# Patient Record
Sex: FEMALE | Race: WHITE | Employment: OTHER | URBAN - METROPOLITAN AREA
[De-identification: names, ages, dates, MRNs, and addresses within clinical notes are randomized per-mention and may not be internally consistent; named-entity substitution may affect disease eponyms.]

---

## 2017-06-10 RX ORDER — MAGNESIUM SULFATE HEPTAHYDRATE 40 MG/ML
2 INJECTION, SOLUTION INTRAVENOUS ONCE
Status: DISCONTINUED | OUTPATIENT
Start: 2017-06-12 | End: 2017-06-10

## 2017-06-12 ENCOUNTER — HOSPITAL ENCOUNTER (OUTPATIENT)
Dept: INFUSION THERAPY | Age: 72
Discharge: HOME | End: 2017-06-12
Payer: MEDICARE

## 2017-06-12 VITALS
HEART RATE: 63 BPM | DIASTOLIC BLOOD PRESSURE: 60 MMHG | OXYGEN SATURATION: 94 % | RESPIRATION RATE: 18 BRPM | TEMPERATURE: 97 F | SYSTOLIC BLOOD PRESSURE: 108 MMHG

## 2017-06-12 PROCEDURE — 74011250636 HC RX REV CODE- 250/636: Performed by: INTERNAL MEDICINE

## 2017-06-12 PROCEDURE — 96365 THER/PROPH/DIAG IV INF INIT: CPT

## 2017-06-12 PROCEDURE — 96366 THER/PROPH/DIAG IV INF ADDON: CPT

## 2017-06-12 RX ORDER — MAGNESIUM SULFATE HEPTAHYDRATE 40 MG/ML
2 INJECTION, SOLUTION INTRAVENOUS ONCE
Status: COMPLETED | OUTPATIENT
Start: 2017-06-12 | End: 2017-06-12

## 2017-06-12 RX ORDER — SODIUM CHLORIDE 9 MG/ML
25 INJECTION, SOLUTION INTRAVENOUS CONTINUOUS
Status: DISPENSED | OUTPATIENT
Start: 2017-06-12 | End: 2017-06-13

## 2017-06-12 RX ORDER — SODIUM CHLORIDE 0.9 % (FLUSH) 0.9 %
10-40 SYRINGE (ML) INJECTION AS NEEDED
Status: DISCONTINUED | OUTPATIENT
Start: 2017-06-12 | End: 2017-06-16 | Stop reason: HOSPADM

## 2017-06-12 RX ADMIN — SODIUM CHLORIDE 25 ML/HR: 0.9 INJECTION, SOLUTION INTRAVENOUS at 08:28

## 2017-06-12 RX ADMIN — Medication 10 ML: at 08:20

## 2017-06-12 RX ADMIN — Medication 10 ML: at 10:45

## 2017-06-12 RX ADMIN — MAGNESIUM SULFATE IN WATER 2 G: 40 INJECTION, SOLUTION INTRAVENOUS at 09:56

## 2017-06-12 RX ADMIN — MAGNESIUM SULFATE HEPTAHYDRATE 2 G: 40 INJECTION, SOLUTION INTRAVENOUS at 08:28

## 2017-06-12 NOTE — PROGRESS NOTES
[facility] Progress Note    Date: 2017    Name: Jamie Neil    MRN: [id number]         : 1945      Ms. Ernestina Olivier was assessed and education was provided. Patient has been to the infusion center before for Mag Sulfate many times. Patient cannot tolerate PO Mag. Ms. Filipe Rashid vitals were reviewed and patient was observed for 5 minutes prior to treatment. Visit Vitals    /60 (BP 1 Location: Left arm, BP Patient Position: At rest;Sitting)    Pulse 63    Temp 97 °F (36.1 °C)    Resp 18    SpO2 94%    Breastfeeding No       Lab results were reviewed. PIV #24G established in left ac [id number], positive for blood return. Before first bag of Mag sulfate was completed, pump kept alarming. Line was flushed and was positive for blood return, but pump continued to alarm. Removed PIV, placed gauze and coban. Established new PIV #22G in R AC, positive for blood return, second bag of Mag sulfate infused. 2g of mag sulfate x2 were infused over 1 hr each. PIV removed, gauze and coban placed    Ms. Leonard tolerated the infusion, and had no complaints. Patient armband removed and shredded. Ms. Ernestina Olivier was discharged from [place] [address] in stable condition at 1050. She has no further appointment with \[facility]\"" at this time.     Ludy Grimm RN  2017  11:24 AM

## 2017-07-03 ENCOUNTER — HOSPITAL ENCOUNTER (OUTPATIENT)
Dept: MAMMOGRAPHY | Age: 72
Discharge: HOME | End: 2017-07-03
Attending: INTERNAL MEDICINE
Payer: MEDICARE

## 2017-07-03 DIAGNOSIS — Z12.31 VISIT FOR SCREENING MAMMOGRAM: ICD-10-CM

## 2017-07-03 PROCEDURE — 77067 SCR MAMMO BI INCL CAD: CPT

## 2017-08-18 RX ORDER — MAGNESIUM SULFATE HEPTAHYDRATE 40 MG/ML
2 INJECTION, SOLUTION INTRAVENOUS ONCE
Status: DISCONTINUED | OUTPATIENT
Start: 2017-08-21 | End: 2017-08-18

## 2017-08-21 ENCOUNTER — HOSPITAL ENCOUNTER (OUTPATIENT)
Dept: INFUSION THERAPY | Age: 72
Discharge: HOME | End: 2017-08-21
Payer: MEDICARE

## 2017-08-21 VITALS
OXYGEN SATURATION: 97 % | RESPIRATION RATE: 18 BRPM | HEART RATE: 72 BPM | SYSTOLIC BLOOD PRESSURE: 134 MMHG | TEMPERATURE: 97.1 F | DIASTOLIC BLOOD PRESSURE: 66 MMHG

## 2017-08-21 PROCEDURE — 74011250636 HC RX REV CODE- 250/636: Performed by: INTERNAL MEDICINE

## 2017-08-21 PROCEDURE — 96365 THER/PROPH/DIAG IV INF INIT: CPT

## 2017-08-21 RX ORDER — MAGNESIUM SULFATE HEPTAHYDRATE 40 MG/ML
2 INJECTION, SOLUTION INTRAVENOUS ONCE
Status: DISCONTINUED | OUTPATIENT
Start: 2017-08-21 | End: 2017-08-21

## 2017-08-21 RX ORDER — SODIUM CHLORIDE 9 MG/ML
50 INJECTION, SOLUTION INTRAVENOUS ONCE
Status: COMPLETED | OUTPATIENT
Start: 2017-08-21 | End: 2017-08-21

## 2017-08-21 RX ORDER — MAGNESIUM SULFATE IN 0.9% NACL 2 G/100 ML
2 INTRAVENOUS SOLUTION, PIGGYBACK (ML) INTRAVENOUS ONCE
Status: COMPLETED | OUTPATIENT
Start: 2017-08-21 | End: 2017-08-21

## 2017-08-21 RX ORDER — SODIUM CHLORIDE 0.9 % (FLUSH) 0.9 %
10-40 SYRINGE (ML) INJECTION AS NEEDED
Status: DISCONTINUED | OUTPATIENT
Start: 2017-08-21 | End: 2017-08-25 | Stop reason: HOSPADM

## 2017-08-21 RX ADMIN — Medication 2 G: at 08:34

## 2017-08-21 RX ADMIN — Medication 10 ML: at 08:17

## 2017-08-21 RX ADMIN — SODIUM CHLORIDE 50 ML/HR: 900 INJECTION, SOLUTION INTRAVENOUS at 08:17

## 2017-10-03 RX ORDER — MAGNESIUM SULFATE HEPTAHYDRATE 40 MG/ML
2 INJECTION, SOLUTION INTRAVENOUS ONCE
Status: DISCONTINUED | OUTPATIENT
Start: 2017-10-06 | End: 2017-10-03

## 2017-10-06 ENCOUNTER — HOSPITAL ENCOUNTER (OUTPATIENT)
Dept: INFUSION THERAPY | Age: 72
Discharge: HOME | End: 2017-10-06
Payer: MEDICARE

## 2017-10-06 VITALS
RESPIRATION RATE: 18 BRPM | TEMPERATURE: 97.6 F | DIASTOLIC BLOOD PRESSURE: 65 MMHG | SYSTOLIC BLOOD PRESSURE: 129 MMHG | HEART RATE: 71 BPM | OXYGEN SATURATION: 98 %

## 2017-10-06 PROCEDURE — 74011000258 HC RX REV CODE- 258: Performed by: INTERNAL MEDICINE

## 2017-10-06 PROCEDURE — 96366 THER/PROPH/DIAG IV INF ADDON: CPT

## 2017-10-06 PROCEDURE — 74011250636 HC RX REV CODE- 250/636: Performed by: INTERNAL MEDICINE

## 2017-10-06 PROCEDURE — 96365 THER/PROPH/DIAG IV INF INIT: CPT

## 2017-10-06 RX ORDER — SODIUM CHLORIDE 0.9 % (FLUSH) 0.9 %
10-40 SYRINGE (ML) INJECTION AS NEEDED
Status: DISCONTINUED | OUTPATIENT
Start: 2017-10-06 | End: 2017-10-10 | Stop reason: HOSPADM

## 2017-10-06 RX ADMIN — Medication 10 ML: at 08:19

## 2017-10-06 RX ADMIN — MAGNESIUM SULFATE HEPTAHYDRATE: 500 INJECTION, SOLUTION INTRAMUSCULAR; INTRAVENOUS at 08:21

## 2017-10-06 RX ADMIN — Medication 10 ML: at 10:03

## 2017-10-06 NOTE — PROGRESS NOTES
[facility] Progress Note    Date: 2017    Name: Sinai Murdock    MRN: [id number]         : 1945      Ms. Conrad Mcardle arrived to [facility] at 1293. Ms. Conrad Mcardle was assessed and education was provided. Ms. Irma Wilkes vitals were reviewed. Visit Vitals    /65    Pulse 71    Temp 97.6 °F (36.4 °C)    Resp 18    SpO2 98%    Breastfeeding No       24g IV inserted in patient's left AC x1 attempt. Positive for blood return/ flushes without difficulty. Magnesium Sulfate 2gm IV administered as ordered followed by NS flush. Ms. Conrad Mcardle tolerated well without complaints. IV removed/ intact. Gauze/ coban to site. Ms. Conrad Mcardle was discharged from [place] [address] in stable condition at 1010. She has no future appointments with the [facility] at this time.     Avelina Krishna  2017

## 2017-12-18 ENCOUNTER — HOSPITAL ENCOUNTER (OUTPATIENT)
Dept: INFUSION THERAPY | Age: 72
Discharge: HOME | End: 2017-12-18
Payer: MEDICARE

## 2017-12-18 VITALS
RESPIRATION RATE: 16 BRPM | WEIGHT: 133.9 LBS | OXYGEN SATURATION: 99 % | SYSTOLIC BLOOD PRESSURE: 115 MMHG | HEART RATE: 77 BPM | HEIGHT: 61 IN | TEMPERATURE: 97 F | DIASTOLIC BLOOD PRESSURE: 60 MMHG | BODY MASS INDEX: 25.28 KG/M2

## 2017-12-18 PROCEDURE — 74011250636 HC RX REV CODE- 250/636

## 2017-12-18 PROCEDURE — 96365 THER/PROPH/DIAG IV INF INIT: CPT

## 2017-12-18 PROCEDURE — 96366 THER/PROPH/DIAG IV INF ADDON: CPT

## 2017-12-18 PROCEDURE — 74011250636 HC RX REV CODE- 250/636: Performed by: INTERNAL MEDICINE

## 2017-12-18 PROCEDURE — 74011000258 HC RX REV CODE- 258: Performed by: INTERNAL MEDICINE

## 2017-12-18 RX ORDER — SODIUM CHLORIDE 0.9 % (FLUSH) 0.9 %
10-40 SYRINGE (ML) INJECTION AS NEEDED
Status: DISCONTINUED | OUTPATIENT
Start: 2017-12-18 | End: 2017-12-22 | Stop reason: HOSPADM

## 2017-12-18 RX ORDER — MAGNESIUM SULFATE HEPTAHYDRATE 40 MG/ML
2 INJECTION, SOLUTION INTRAVENOUS ONCE
Status: COMPLETED | OUTPATIENT
Start: 2017-12-18 | End: 2017-12-18

## 2017-12-18 RX ORDER — SODIUM CHLORIDE 9 MG/ML
250 INJECTION, SOLUTION INTRAVENOUS AS NEEDED
Status: DISPENSED | OUTPATIENT
Start: 2017-12-18 | End: 2017-12-19

## 2017-12-18 RX ORDER — MAGNESIUM SULFATE HEPTAHYDRATE 40 MG/ML
INJECTION, SOLUTION INTRAVENOUS
Status: COMPLETED
Start: 2017-12-18 | End: 2017-12-18

## 2017-12-18 RX ADMIN — Medication 10 ML: at 14:31

## 2017-12-18 RX ADMIN — MAGNESIUM SULFATE HEPTAHYDRATE 2 G: 40 INJECTION, SOLUTION INTRAVENOUS at 14:32

## 2017-12-18 RX ADMIN — Medication 10 ML: at 13:38

## 2017-12-18 RX ADMIN — MAGNESIUM SULFATE HEPTAHYDRATE: 500 INJECTION, SOLUTION INTRAMUSCULAR; INTRAVENOUS at 11:40

## 2017-12-18 RX ADMIN — Medication 10 ML: at 15:39

## 2017-12-18 RX ADMIN — Medication 10 ML: at 11:36

## 2017-12-18 NOTE — PROGRESS NOTES
[facility] Progress Note    Date: 2017    Name: Jamie Neil    MRN: [id number]         : 1945      Ms. Ernestina Olivier was assessed and education was provided. Ms. Filipe Rashid vitals were reviewed and patient was observed for 5 minutes prior to treatment. Visit Vitals    /66 (BP 1 Location: Right arm, BP Patient Position: At rest;Sitting)    Pulse 70    Temp 97.2 °F (36.2 °C)    Resp 16    Ht 5' 1\" (1.549 m)    Wt 60.7 kg (133 lb 14.4 oz)    SpO2 99%    Breastfeeding No    BMI 25.3 kg/m2       Magnesium  Total 4 grams IV given via peripheral line left antecubital area. At 1536 with only approx 2 ml magnesium infusion left to infuse, Ms Ernestina Olivier reported achiness in left elbow. Her IV site was left antecubital space. IV was stopped immediately. There was no reddness or swelling noted in the area. There was positive blood return from the IV. The area felt better with repositioning. And last 2 ml infused at patient's request.    Ms Sachin Jaeger declined staying for observation after infusion. Ms. Ernestina Olivier tolerated the infusion, and had no complaints. Patient armband removed and shredded. Ms. Ernestina Olivier was discharged from [place] [address] in stable condition at [phone]. She has no more OPIC appts.     Brandy Camarena RN  2017

## 2017-12-19 ENCOUNTER — HOSPITAL ENCOUNTER (OUTPATIENT)
Dept: INFUSION THERAPY | Age: 72
End: 2017-12-19
Payer: MEDICARE

## 2018-01-29 RX ORDER — MAGNESIUM SULFATE HEPTAHYDRATE 40 MG/ML
2 INJECTION, SOLUTION INTRAVENOUS ONCE
Status: DISCONTINUED | OUTPATIENT
Start: 2018-01-29 | End: 2018-01-29

## 2018-01-30 RX ORDER — MAGNESIUM SULFATE HEPTAHYDRATE 40 MG/ML
2 INJECTION, SOLUTION INTRAVENOUS ONCE
Status: DISCONTINUED | OUTPATIENT
Start: 2018-02-01 | End: 2018-01-30

## 2018-01-31 RX ORDER — MAGNESIUM SULFATE HEPTAHYDRATE 40 MG/ML
2 INJECTION, SOLUTION INTRAVENOUS ONCE
Status: CANCELLED | OUTPATIENT
Start: 2018-02-02 | End: 2018-02-02

## 2018-02-01 ENCOUNTER — HOSPITAL ENCOUNTER (OUTPATIENT)
Dept: INFUSION THERAPY | Age: 73
Discharge: HOME | End: 2018-02-01
Payer: MEDICARE

## 2018-02-01 VITALS
OXYGEN SATURATION: 94 % | RESPIRATION RATE: 18 BRPM | DIASTOLIC BLOOD PRESSURE: 65 MMHG | SYSTOLIC BLOOD PRESSURE: 135 MMHG | TEMPERATURE: 97.2 F | HEART RATE: 61 BPM

## 2018-02-01 PROCEDURE — 74011250636 HC RX REV CODE- 250/636: Performed by: INTERNAL MEDICINE

## 2018-02-01 PROCEDURE — 96366 THER/PROPH/DIAG IV INF ADDON: CPT

## 2018-02-01 PROCEDURE — 96365 THER/PROPH/DIAG IV INF INIT: CPT

## 2018-02-01 RX ORDER — MAGNESIUM SULFATE HEPTAHYDRATE 40 MG/ML
2 INJECTION, SOLUTION INTRAVENOUS ONCE
Status: COMPLETED | OUTPATIENT
Start: 2018-02-01 | End: 2018-02-01

## 2018-02-01 RX ORDER — SODIUM CHLORIDE 0.9 % (FLUSH) 0.9 %
10 SYRINGE (ML) INJECTION AS NEEDED
Status: DISCONTINUED | OUTPATIENT
Start: 2018-02-01 | End: 2018-02-05 | Stop reason: HOSPADM

## 2018-02-01 RX ORDER — METOPROLOL SUCCINATE 50 MG/1
50 TABLET, EXTENDED RELEASE ORAL DAILY
COMMUNITY
End: 2018-08-28

## 2018-02-01 RX ADMIN — Medication 10 ML: at 08:25

## 2018-02-01 RX ADMIN — Medication 10 ML: at 10:58

## 2018-02-01 RX ADMIN — MAGNESIUM SULFATE HEPTAHYDRATE 2 G: 40 INJECTION, SOLUTION INTRAVENOUS at 08:33

## 2018-02-01 RX ADMIN — MAGNESIUM SULFATE HEPTAHYDRATE 2 G: 40 INJECTION, SOLUTION INTRAVENOUS at 09:38

## 2018-02-01 NOTE — PROGRESS NOTES
[facility] Progress Note    Date: 2018    Name: Keisha Ott    MRN: [id number]         : 1945      Ms. Noemi Lundberg arrived to [facility] at 810. Ms. Noemi Lundberg was assessed and education was provided. Patient reports intermittent diarrhea but denies any other complaints today. Ms. Penelope Conrad vitals were reviewed. Visit Vitals    /68 (BP 1 Location: Left arm, BP Patient Position: Sitting)    Pulse 72    Temp 97.2 °F (36.2 °C)    Resp 18    SpO2 100%    Breastfeeding No       24g IV inserted in patient's Left antecubital  x1 attempt. Positive for blood return/ flushes without difficulty. 4 g Mag Sulfate administered as ordered over 2 hours followed by NS flush. Ms. Noemi Lundberg tolerated well without complaints. IV removed/ intact. Gauze/ coban to site. Ms. Noemi Lundberg was discharged from [place] [address] in stable condition at . No further appointments are scheduled at this office. Patient is advised to follow up with her physician.   Robert Parks RN  2018

## 2018-02-02 ENCOUNTER — HOSPITAL ENCOUNTER (OUTPATIENT)
Dept: INFUSION THERAPY | Age: 73
End: 2018-02-02
Payer: MEDICARE

## 2018-05-30 NOTE — PROGRESS NOTES
[facility] Progress Note    Date: 2017    Name: Wendy Olguin    MRN: [id number]         : 1945      Ms. Ever Collins arrived to [facility] at 3775. Ms. Ever Collins was assessed and education was provided. Ms. Natalie Ramírez vitals were reviewed. Visit Vitals    /69 (BP 1 Location: Left arm, BP Patient Position: Sitting)    Pulse 71    Temp 97.1 °F (36.2 °C)    Resp 18    SpO2 98%       24g IV inserted in patient's left AC x1 attempt. Positive for blood return/ flushes without difficulty. Magnesium Sulfate 2gm IV administered as ordered followed by NS flush. Ms. Ever Collins tolerated well without complaints. IV removed/ intact. Gauze/ coban to site. Ms. Ever Collins was discharged from [place] [address] in stable condition at [address] She has no future appointments with the [facility] at this time.     Renetta Altman RN  2017
No

## 2018-05-31 RX ORDER — MAGNESIUM SULFATE HEPTAHYDRATE 40 MG/ML
2 INJECTION, SOLUTION INTRAVENOUS ONCE
Status: DISCONTINUED | OUTPATIENT
Start: 2018-06-01 | End: 2018-05-31

## 2018-06-01 ENCOUNTER — HOSPITAL ENCOUNTER (OUTPATIENT)
Dept: INFUSION THERAPY | Age: 73
Discharge: HOME | End: 2018-06-01
Payer: MEDICARE

## 2018-06-01 VITALS
RESPIRATION RATE: 18 BRPM | HEART RATE: 80 BPM | OXYGEN SATURATION: 94 % | TEMPERATURE: 98.1 F | SYSTOLIC BLOOD PRESSURE: 139 MMHG | DIASTOLIC BLOOD PRESSURE: 72 MMHG

## 2018-06-01 PROCEDURE — 96365 THER/PROPH/DIAG IV INF INIT: CPT

## 2018-06-01 PROCEDURE — 74011250636 HC RX REV CODE- 250/636: Performed by: INTERNAL MEDICINE

## 2018-06-01 RX ORDER — SODIUM CHLORIDE 0.9 % (FLUSH) 0.9 %
10-40 SYRINGE (ML) INJECTION AS NEEDED
Status: DISCONTINUED | OUTPATIENT
Start: 2018-06-01 | End: 2018-06-05 | Stop reason: HOSPADM

## 2018-06-01 RX ORDER — MAGNESIUM SULFATE HEPTAHYDRATE 40 MG/ML
2 INJECTION, SOLUTION INTRAVENOUS ONCE
Status: COMPLETED | OUTPATIENT
Start: 2018-06-01 | End: 2018-06-01

## 2018-06-01 RX ADMIN — Medication 10 ML: at 09:15

## 2018-06-01 RX ADMIN — Medication 10 ML: at 10:18

## 2018-06-01 RX ADMIN — MAGNESIUM SULFATE HEPTAHYDRATE 2 G: 40 INJECTION, SOLUTION INTRAVENOUS at 09:23

## 2018-06-01 NOTE — PROGRESS NOTES
[facility] Progress Note    Date: 2018    Name: Jaycee Thomas    MRN: [id number]         : 1945     Mag Infusion 2GM      Ms. Kade Smith arrived to [facility] at [phone]. Ms. Kade Smith was assessed and education was provided. Ms. Madeline Govea vitals were reviewed. Visit Vitals    /72 (BP 1 Location: Left arm, BP Patient Position: Sitting)    Pulse 80    Temp 98.1 °F (36.7 °C)    Resp 18    SpO2 94%    Breastfeeding No       24g IV inserted in patient's left AC x1 attempt. Positive for blood return/ flushes without difficulty. Magnesium Sulfate 2gm IV administered as ordered over 1 hour followed by NS flush. Ms. Kade Smith tolerated well without complaints. IV removed/ intact. Gauze/ coban to site. Ms. Kade Smith was discharged from [place] [address] in stable condition at 1020. She has no future appointments with the [facility] at this time.     Hardik Jurado RN  2018

## 2018-07-09 ENCOUNTER — HOSPITAL ENCOUNTER (OUTPATIENT)
Dept: MAMMOGRAPHY | Age: 73
Discharge: HOME | End: 2018-07-09
Attending: INTERNAL MEDICINE
Payer: MEDICARE

## 2018-07-09 ENCOUNTER — HOSPITAL ENCOUNTER (OUTPATIENT)
Dept: GENERAL RADIOLOGY | Age: 73
Discharge: HOME | End: 2018-07-09
Attending: INTERNAL MEDICINE
Payer: MEDICARE

## 2018-07-09 DIAGNOSIS — Z12.31 VISIT FOR SCREENING MAMMOGRAM: ICD-10-CM

## 2018-07-09 DIAGNOSIS — Z87.39 HISTORY OF OSTEOPENIA: ICD-10-CM

## 2018-07-09 DIAGNOSIS — Z78.0 ASYMPTOMATIC MENOPAUSAL STATE: ICD-10-CM

## 2018-07-09 PROCEDURE — 77080 DXA BONE DENSITY AXIAL: CPT

## 2018-07-09 PROCEDURE — 77067 SCR MAMMO BI INCL CAD: CPT

## 2018-07-25 RX ORDER — MAGNESIUM SULFATE HEPTAHYDRATE 40 MG/ML
2 INJECTION, SOLUTION INTRAVENOUS ONCE
Status: DISCONTINUED | OUTPATIENT
Start: 2018-07-30 | End: 2018-07-25 | Stop reason: SDUPTHER

## 2018-07-30 ENCOUNTER — HOSPITAL ENCOUNTER (OUTPATIENT)
Dept: INFUSION THERAPY | Age: 73
Discharge: HOME | End: 2018-07-30
Payer: MEDICARE

## 2018-07-30 VITALS
DIASTOLIC BLOOD PRESSURE: 72 MMHG | RESPIRATION RATE: 18 BRPM | SYSTOLIC BLOOD PRESSURE: 153 MMHG | TEMPERATURE: 97.1 F | OXYGEN SATURATION: 97 % | HEART RATE: 80 BPM

## 2018-07-30 PROCEDURE — 96365 THER/PROPH/DIAG IV INF INIT: CPT

## 2018-07-30 PROCEDURE — 74011250636 HC RX REV CODE- 250/636: Performed by: INTERNAL MEDICINE

## 2018-07-30 RX ORDER — SODIUM CHLORIDE 0.9 % (FLUSH) 0.9 %
10-40 SYRINGE (ML) INJECTION AS NEEDED
Status: DISCONTINUED | OUTPATIENT
Start: 2018-07-30 | End: 2018-08-03 | Stop reason: HOSPADM

## 2018-07-30 RX ORDER — MAGNESIUM SULFATE HEPTAHYDRATE 40 MG/ML
2 INJECTION, SOLUTION INTRAVENOUS ONCE
Status: COMPLETED | OUTPATIENT
Start: 2018-07-30 | End: 2018-07-30

## 2018-07-30 RX ADMIN — MAGNESIUM SULFATE HEPTAHYDRATE 2 G: 40 INJECTION, SOLUTION INTRAVENOUS at 08:27

## 2018-07-30 RX ADMIN — Medication 10 ML: at 09:25

## 2018-07-30 RX ADMIN — Medication 10 ML: at 08:30

## 2018-07-30 NOTE — PROGRESS NOTES
[facility] Progress Note Date: 2018 Name: Kade Metz MRN: [id number] : 1945 Mag Infusion 2GM Ms. Peter Roy arrived to [facility] at 56. Ms. Peter Roy was assessed and education was provided. Ms. Jeniffer Hackett vitals were reviewed. Visit Vitals  /72 (BP 1 Location: Right arm, BP Patient Position: Sitting)  Pulse 80  Temp 97.1 °F (36.2 °C)  Resp 18  SpO2 97%  Breastfeeding No  
 
 
24g IV inserted in patient's left AC x1 attempt. Positive for blood return/ flushes without difficulty. Magnesium Sulfate 2gm IV administered as ordered over 1 hour followed by NS flush. Ms. Peter Roy tolerated well without complaints. IV removed/ intact. Gauze/ coban to site. Ms. Peter Roy was discharged from [place] [address] in stable condition at 0930. She has no future appointments with the [facility] at this time. Dot Andres RN 
2018

## 2018-08-25 ENCOUNTER — HOSPITAL ENCOUNTER (INPATIENT)
Age: 73
LOS: 3 days | Discharge: HOME | DRG: 483 | End: 2018-08-28
Attending: EMERGENCY MEDICINE | Admitting: INTERNAL MEDICINE
Payer: MEDICARE

## 2018-08-25 ENCOUNTER — APPOINTMENT (OUTPATIENT)
Dept: GENERAL RADIOLOGY | Age: 73
DRG: 483 | End: 2018-08-25
Attending: PHYSICIAN ASSISTANT
Payer: MEDICARE

## 2018-08-25 ENCOUNTER — APPOINTMENT (OUTPATIENT)
Dept: GENERAL RADIOLOGY | Age: 73
DRG: 483 | End: 2018-08-25
Attending: NURSE PRACTITIONER
Payer: MEDICARE
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
## 2018-08-25 DIAGNOSIS — S43.004A SHOULDER DISLOCATION, RIGHT, INITIAL ENCOUNTER: ICD-10-CM

## 2018-08-25 DIAGNOSIS — S42.354A CLOSED NONDISPLACED COMMINUTED FRACTURE OF SHAFT OF RIGHT HUMERUS, INITIAL ENCOUNTER: Primary | ICD-10-CM

## 2018-08-25 DIAGNOSIS — S92.355A CLOSED NONDISPLACED FRACTURE OF FIFTH METATARSAL BONE OF LEFT FOOT, INITIAL ENCOUNTER: ICD-10-CM

## 2018-08-25 DIAGNOSIS — S00.31XA ABRASION OF NOSE, INITIAL ENCOUNTER: ICD-10-CM

## 2018-08-25 PROBLEM — S42.91XA SHOULDER FRACTURE, RIGHT, CLOSED, INITIAL ENCOUNTER: Status: ACTIVE | Noted: 2018-08-25

## 2018-08-25 LAB
ALBUMIN SERPL-MCNC: 4.2 G/DL (ref 3.4–5)
ALBUMIN/GLOB SERPL: 1.2 {RATIO} (ref 0.8–1.7)
ALP SERPL-CCNC: 57 U/L (ref 45–117)
ALT SERPL-CCNC: 33 U/L (ref 13–56)
ANION GAP SERPL CALC-SCNC: 12 MMOL/L (ref 3–18)
AST SERPL-CCNC: 22 U/L (ref 15–37)
BASOPHILS # BLD: 0 K/UL (ref 0–0.1)
BASOPHILS NFR BLD: 0 % (ref 0–2)
BILIRUB SERPL-MCNC: 0.3 MG/DL (ref 0.2–1)
BUN SERPL-MCNC: 11 MG/DL (ref 7–18)
BUN/CREAT SERPL: 10 (ref 12–20)
CALCIUM SERPL-MCNC: 9 MG/DL (ref 8.5–10.1)
CHLORIDE SERPL-SCNC: 105 MMOL/L (ref 100–108)
CO2 SERPL-SCNC: 24 MMOL/L (ref 21–32)
CREAT SERPL-MCNC: 1.07 MG/DL (ref 0.6–1.3)
DIFFERENTIAL METHOD BLD: ABNORMAL
EOSINOPHIL # BLD: 0.1 K/UL (ref 0–0.4)
EOSINOPHIL NFR BLD: 1 % (ref 0–5)
ERYTHROCYTE [DISTWIDTH] IN BLOOD BY AUTOMATED COUNT: 12.7 % (ref 11.6–14.5)
GLOBULIN SER CALC-MCNC: 3.6 G/DL (ref 2–4)
GLUCOSE SERPL-MCNC: 124 MG/DL (ref 74–99)
HCT VFR BLD AUTO: 41.2 % (ref 35–45)
HGB BLD-MCNC: 14.2 G/DL (ref 12–16)
INR PPP: 0.9 (ref 0.8–1.2)
LYMPHOCYTES # BLD: 2.1 K/UL (ref 0.9–3.6)
LYMPHOCYTES NFR BLD: 17 % (ref 21–52)
MCH RBC QN AUTO: 32.2 PG (ref 24–34)
MCHC RBC AUTO-ENTMCNC: 34.5 G/DL (ref 31–37)
MCV RBC AUTO: 93.4 FL (ref 74–97)
MONOCYTES # BLD: 1 K/UL (ref 0.05–1.2)
MONOCYTES NFR BLD: 8 % (ref 3–10)
NEUTS SEG # BLD: 9.1 K/UL (ref 1.8–8)
NEUTS SEG NFR BLD: 74 % (ref 40–73)
PLATELET # BLD AUTO: 264 K/UL (ref 135–420)
PMV BLD AUTO: 10.4 FL (ref 9.2–11.8)
POTASSIUM SERPL-SCNC: 3.5 MMOL/L (ref 3.5–5.5)
PROT SERPL-MCNC: 7.8 G/DL (ref 6.4–8.2)
PROTHROMBIN TIME: 12.1 SEC (ref 11.5–15.2)
RBC # BLD AUTO: 4.41 M/UL (ref 4.2–5.3)
SODIUM SERPL-SCNC: 141 MMOL/L (ref 136–145)
WBC # BLD AUTO: 12.4 K/UL (ref 4.6–13.2)

## 2018-08-25 PROCEDURE — 65270000029 HC RM PRIVATE

## 2018-08-25 PROCEDURE — 96374 THER/PROPH/DIAG INJ IV PUSH: CPT

## 2018-08-25 PROCEDURE — 73030 X-RAY EXAM OF SHOULDER: CPT

## 2018-08-25 PROCEDURE — 93005 ELECTROCARDIOGRAM TRACING: CPT

## 2018-08-25 PROCEDURE — 73130 X-RAY EXAM OF HAND: CPT

## 2018-08-25 PROCEDURE — 74011250636 HC RX REV CODE- 250/636: Performed by: PHYSICIAN ASSISTANT

## 2018-08-25 PROCEDURE — 77030027138 HC INCENT SPIROMETER -A

## 2018-08-25 PROCEDURE — 71045 X-RAY EXAM CHEST 1 VIEW: CPT

## 2018-08-25 PROCEDURE — 99284 EMERGENCY DEPT VISIT MOD MDM: CPT

## 2018-08-25 PROCEDURE — 85610 PROTHROMBIN TIME: CPT

## 2018-08-25 PROCEDURE — 74011250637 HC RX REV CODE- 250/637: Performed by: NURSE PRACTITIONER

## 2018-08-25 PROCEDURE — 80053 COMPREHEN METABOLIC PANEL: CPT

## 2018-08-25 PROCEDURE — 85025 COMPLETE CBC W/AUTO DIFF WBC: CPT

## 2018-08-25 PROCEDURE — 74011000258 HC RX REV CODE- 258: Performed by: INTERNAL MEDICINE

## 2018-08-25 RX ORDER — MORPHINE SULFATE 2 MG/ML
4 INJECTION, SOLUTION INTRAMUSCULAR; INTRAVENOUS
Status: ACTIVE | OUTPATIENT
Start: 2018-08-25 | End: 2018-08-26

## 2018-08-25 RX ORDER — ONDANSETRON 2 MG/ML
4 INJECTION INTRAMUSCULAR; INTRAVENOUS
Status: COMPLETED | OUTPATIENT
Start: 2018-08-25 | End: 2018-08-25

## 2018-08-25 RX ORDER — HYDROCODONE BITARTRATE AND ACETAMINOPHEN 5; 325 MG/1; MG/1
1 TABLET ORAL
Status: COMPLETED | OUTPATIENT
Start: 2018-08-25 | End: 2018-08-25

## 2018-08-25 RX ORDER — MORPHINE SULFATE 4 MG/ML
4 INJECTION INTRAVENOUS
Status: DISCONTINUED | OUTPATIENT
Start: 2018-08-25 | End: 2018-08-26 | Stop reason: RX

## 2018-08-25 RX ORDER — DEXTROSE MONOHYDRATE AND SODIUM CHLORIDE 5; .9 G/100ML; G/100ML
75 INJECTION, SOLUTION INTRAVENOUS CONTINUOUS
Status: DISCONTINUED | OUTPATIENT
Start: 2018-08-25 | End: 2018-08-27

## 2018-08-25 RX ORDER — MORPHINE SULFATE 4 MG/ML
4 INJECTION INTRAVENOUS
Status: COMPLETED | OUTPATIENT
Start: 2018-08-25 | End: 2018-08-25

## 2018-08-25 RX ADMIN — SODIUM CHLORIDE 1000 ML: 900 INJECTION, SOLUTION INTRAVENOUS at 21:15

## 2018-08-25 RX ADMIN — MORPHINE SULFATE 4 MG: 4 INJECTION INTRAVENOUS at 23:05

## 2018-08-25 RX ADMIN — MORPHINE SULFATE 4 MG: 4 INJECTION INTRAVENOUS at 20:54

## 2018-08-25 RX ADMIN — ONDANSETRON 4 MG: 2 INJECTION, SOLUTION INTRAMUSCULAR; INTRAVENOUS at 21:13

## 2018-08-25 RX ADMIN — HYDROCODONE BITARTRATE AND ACETAMINOPHEN 1 TABLET: 5; 325 TABLET ORAL at 20:14

## 2018-08-25 RX ADMIN — DEXTROSE MONOHYDRATE AND SODIUM CHLORIDE 75 ML/HR: 5; .9 INJECTION, SOLUTION INTRAVENOUS at 23:04

## 2018-08-25 NOTE — ED NOTES
I performed a brief evaluation, including history and physical, of the patient here in triage and I have determined that pt will need further treatment and evaluation from the main side ER physician. I have placed initial orders to help in expediting patients care. August 25, 2018 at 7:43 PM -  Shola Dunn NP        Visit Vitals    /65 (BP 1 Location: Right arm, BP Patient Position: At rest)    Pulse 65    Temp 97.7 °F (36.5 °C)    Resp 16    Ht 5' 1\" (1.549 m)    Wt 57.6 kg (127 lb)    SpO2 98%    BMI 24 kg/m2          Scribe Attestation     Pin digital acting as a scribe for and in the presence of Shola Dunn NP      August 25, 2018 at 7:43 PM       Provider Attestation:      I personally performed the services described in the documentation, reviewed the documentation, as recorded by the scribe in my presence, and it accurately and completely records my words and actions.  August 25, 2018 at 7:43 PM - Shola Dunn NP

## 2018-08-25 NOTE — IP AVS SNAPSHOT
[address] Patient: Sydney Brunner MRN: [id number] :1945 About your hospitalization You were admitted on:  2018 You last received care in the:  [address] [place] [address],[address] You were discharged on:  2018 Why you were hospitalized Your primary diagnosis was:  Not on File Your diagnoses also included:  Shoulder Fracture, Right, Closed, Initial Encounter, Shoulder Dislocation, Right, Initial Encounter, Fracture Of Proximal Phalanx Of Left Hand, Htn (Hypertension), Myasthenia Gravis (Hcc) Follow-up Information Follow up With Details Comments Contact Info Paola Hall MD   [address] [facility] [address] [facility] [address] [phone] Mariangel Sims MD  as scheduled [place] [address] [address] [facility] [address] 
[phone] Discharge Orders None A check hollie indicates which time of day the medication should be taken. My Medications START taking these medications Instructions Each Dose to Equal  
 Morning Noon Evening Bedtime  
 oxyCODONE-acetaminophen 5-325 mg per tablet Commonly known as:  PERCOCET Your last dose was: Your next dose is: Take 1 Tab by mouth every four (4) hours as needed. Max Daily Amount: 6 Tabs. 1 Tab CONTINUE taking these medications Instructions Each Dose to Equal  
 Morning Noon Evening Bedtime  
 calcium carbonate 500 mg calcium (1,250 mg) tablet Commonly known as:  OS-ERA Your last dose was: Your next dose is: Take  by mouth two (2) times a day. Indications: HYPOCALCEMIA PREVENTION  
     
   
   
   
  
 COZAAR 100 mg tablet Generic drug:  losartan Your last dose was: Your next dose is: Take 100 mg by mouth daily.  Indications: HYPERTENSION  
 100 mg  
    
   
   
   
  
 NORVASC PO Your last dose was: Your next dose is: Take 7.5 mg by mouth daily. 7.5 mg  
    
   
   
   
  
 potassium chloride 20 mEq tablet Commonly known as:  K-CHRISTIANO DIAZOR-CON Your last dose was: Your next dose is: Take 20 mEq by mouth two (2) times a day. Indications: HYPOKALEMIA PREVENTION  
 20 mEq PriLOSEC 20 mg capsule Generic drug:  omeprazole Your last dose was: Your next dose is: Take 20 mg by mouth daily. Indications: GASTROESOPHAGEAL REFLUX  
 20 mg  
    
   
   
   
  
 ZOCOR 40 mg tablet Generic drug:  simvastatin Your last dose was: Your next dose is: Take 40 mg by mouth nightly. Indications: HYPERCHOLESTEROLEMIA 40 mg  
    
   
   
   
  
  
STOP taking these medications   
 metoprolol succinate 50 mg XL tablet Commonly known as:  TOPROL-XL Where to Get Your Medications Information on where to get these meds will be given to you by the nurse or doctor. ! Ask your nurse or doctor about these medications  
  oxyCODONE-acetaminophen 5-325 mg per tablet Opioid Education Prescription Opioids: What You Need to Know: 
 
 
 
F-face looks uneven A-arms unable to move or move unevenly S-speech slurred or non-existent T-time-call 911 as soon as signs and symptoms begin-DO NOT go  
 Back to bed or wait to see if you get better-TIME IS BRAIN. Warning Signs of HEART ATTACK Call 911 if you have these symptoms: 
? Chest discomfort. Most heart attacks involve discomfort in the center of the chest that lasts more than a few minutes, or that goes away and comes back. It can feel like uncomfortable pressure, squeezing, fullness, or pain. ? Discomfort in other areas of the upper body. Symptoms can include pain or discomfort in one or both arms, the back, neck, jaw, or stomach. ? Shortness of breath with or without chest discomfort. ? Other signs may include breaking out in a cold sweat, nausea, or lightheadedness. Don't wait more than five minutes to call [address]! Fast action can save your life. Calling 911 is almost always the fastest way to get lifesaving treatment. Emergency Medical Services staff can begin treatment when they arrive  up to an hour sooner than if someone gets to the hospital by car. The discharge information has been reviewed with the patient. The patient verbalized understanding. Discharge medications reviewed with the patient and appropriate educational materials and side effects teaching were provided. Patient armband removed and shredded. ___________________________________________________________________________________________________________________________________ Introducing [facility]! [employer] introduces RockeTalk patient portal. Now you can access parts of your medical record, email your doctor's office, and request medication refills online. 1. In your internet browser, go to [email or web address] (acquired by [employer]). Bluenote/D.light Designt 2. Click on the First Time User? Click Here link in the Sign In box. You will see the New Member Sign Up page. 3. Enter your RockeTalk Access Code exactly as it appears below. You will not need to use this code after youve completed the sign-up process.  If you do not sign up before the expiration date, you must request a new code. · PolyPid Access Code: [email or web address] Expires: 11/23/2018  7:43 PM 
 
4. Enter the last four digits of your Social Security Number (xxxx) and Date of Birth (mm/dd/yyyy) as indicated and click Submit. You will be taken to the next sign-up page. 5. Create a PolyPid ID. This will be your PolyPid login ID and cannot be changed, so think of one that is secure and easy to remember. 6. Create a PolyPid password. You can change your password at any time. 7. Enter your Password Reset Question and Answer. This can be used at a later time if you forget your password. 8. Enter your e-mail address. You will receive e-mail notification when new information is available in [address] 9. Click Sign Up. You can now view and download portions of your medical record. 10. Click the Download Summary menu link to download a portable copy of your medical information. If you have questions, please visit the Frequently Asked Questions section of the PolyPid website. Remember, PolyPid is NOT to be used for urgent needs. For medical emergencies, dial 911. Now available from your iPhone and Android! Introducing Mundo Barrios As a [facility] patient, I wanted to make you aware of our electronic visit tool called Mundo Barrios. [facility] 24/7 allows you to connect within minutes with a medical provider 24 hours a day, seven days a week via a mobile device or tablet or logging into a secure website from your computer. You can access Mundo Barrios from anywhere in the [place].  
 
A virtual visit might be right for you when you have a simple condition and feel like you just dont want to get out of bed, or cant get away from work for an appointment, when your regular [facility] provider is not available (evenings, weekends or holidays), or when youre out of town and need minor care. Electronic visits cost only $49 and if the [facility] 24/7 provider determines a prescription is needed to treat your condition, one can be electronically transmitted to a nearby pharmacy*. Please take a moment to enroll today if you have not already done so. The enrollment process is free and takes just a few minutes. To enroll, please download the SandLinks violet to your tablet or phone, or visit [email or web address] to enroll on your computer. And, as an [address] [place] [address] patient with a [employer] account, the results of your visits will be scanned into your electronic medical record and your primary care provider will be able to view the scanned results. We urge you to continue to see your regular [facility] provider for your ongoing medical care. And while your primary care provider may not be the one available when you seek a Bread virtual visit, the peace of mind you get from getting a real diagnosis real time can be priceless. For more information on Bread, view our Frequently Asked Questions (FAQs) at [email or web address]. Sincerely, 
 
Kayla Trejo MD 
Chief Medical Officer [address] *:  certain medications cannot be prescribed via Bread Unresulted Labs-Please follow up with your PCP about these lab tests Order Current Status NC XR TECHNOLOGIST SERVICE In process Providers Seen During Your Hospitalization Provider Specialty Primary office phone Andrew Smallwood MD Emergency Medicine [phone] Karmen Figueroa MD Geriatric Medicine [phone] Your Primary Care Physician (PCP) Primary Care Physician Office Phone Office Fax Jaleesa Benavides [phone] You are allergic to the following No active allergies Recent Documentation Height Weight Breastfeeding? BMI OB Status Smoking Status 1.549 m 57.6 kg No 24 kg/m2 Postmenopausal Current Every Day Smoker Emergency Contacts Name Discharge Info Relation Home Work Mobile [address] CAREGIVER [3] Sister [23] [phone] Patient Belongings The following personal items are in your possession at time of discharge: 
  Dental Appliances: With patient  Visual Aid: Glasses, With patient      Home Medications: None   Jewelry: Earrings  Clothing: With patient    Other Valuables: None (Cellphone sent home with sister, named next of kin) Discharge Instructions Attachments/References SHOULDER REPLACEMENT SURGERY: POST-OP (ENGLISH) OXYCODONE/ACETAMINOPHEN (BY MOUTH) (ENGLISH) Patient Handouts Shoulder Replacement Surgery: What to Expect at Home Your Recovery Shoulder replacement surgery replaces the worn parts of your shoulder joint. When you leave the hospital, your arm will be in a sling. It will be helpful if there is someone to help you at home for the next few weeks or until you have more energy and can move around better. You will go home with a bandage and stitches, staples, tissue glue, or tape strips. You can remove the bandage when your doctor tells you to. If you have staples, your doctor will remove them in 10 to 21 days. If you have stitches that are not the type that dissolve, your doctor will remove them in 10 to 14 days. Glue or tape strips will fall off on their own over time. You may still have some mild pain, and the area may be swollen for several months after surgery. Your doctor will give you medicine for the pain. You will continue the rehabilitation program (rehab) you started in the hospital. The better you do with your rehab exercises, the sooner you will get your strength and movement back. Depending on your job, you may be able to return to work as early as 2 to 3 weeks after surgery, as long as you avoid certain arm movements, such as lifting. This care sheet gives you a general idea about how long it will take for you to recover. But each person recovers at a different pace. Follow the steps below to get better as quickly as possible. How can you care for yourself at home? Activity 
  · Rest when you feel tired. You may take a nap, but don't stay in bed all day.  
  · Work with your physical therapist to learn the best way to exercise.  
  · You will have a sling to wear at night. And it's a good idea to also put a small stack of folded sheets or towels under your upper arm while you are in bed to keep your arm from dropping too far back.  
  · Your arm should stay next to your body or in front of it for several weeks, both while you are up and during sleep.  
  · Don't lift anything with the affected arm for 6 weeks.  
  · Ask your doctor when you can drive again.  
  · Ask your doctor when it is okay for you to have sex.  
  · Your doctor may advise you to give up activities that put stress on that shoulder. This includes sports such as weight lifting or tennis, unless your tennis arm was not the one affected. Diet 
  · By the time you leave the hospital, you will probably be eating your normal diet. If your stomach is upset, try bland, low-fat foods like plain rice, broiled chicken, toast, and yogurt. Your doctor may recommend that you take iron and vitamin supplements.  
  · Drink plenty of fluids (unless your doctor tells you not to).  
  · You may notice that your bowel movements are not regular right after your surgery. This is common. Try to avoid constipation and straining with bowel movements. You may want to take a fiber supplement every day. If you have not had a bowel movement after a couple of days, ask your doctor about taking a mild laxative. Medicines 
  · Your doctor will tell you if and when you can restart your medicines. He or she will also give you instructions about taking any new medicines.   · If you take blood thinners, such as warfarin (Coumadin), clopidogrel (Plavix), or aspirin, be sure to talk to your doctor. He or she will tell you if and when to start taking those medicines again. Make sure that you understand exactly what your doctor wants you to do.  
  · Be safe with medicines. Take pain medicines exactly as directed. ¨ If the doctor gave you a prescription medicine for pain, take it as prescribed. ¨ If you are not taking a prescription pain medicine, ask your doctor if you can take an over-the-counter medicine.  
  · If you think your pain medicine is making you sick to your stomach: 
¨ Take your medicine after meals (unless your doctor has told you not to). ¨ Ask your doctor for a different pain medicine.  
  · If your doctor prescribed antibiotics, take them as directed. Don't stop taking them just because you feel better. You need to take the full course of antibiotics.  
  · If you take a blood thinner, be sure you get instructions about how to take your medicine safely. Blood thinners can cause serious bleeding problems. Incision care 
  · If your doctor told you how to care for your cut (incision), follow your doctor's instructions. You will have a dressing over the cut. A dressing helps the incision heal and protects it. Your doctor will tell you how to take care of this. ·  
  · If you did not get instructions, follow this general advice: ¨ If you have strips of tape on the cut the doctor made, leave the tape on for a week or until it falls off. ¨ If you have stitches or staples, your doctor will tell you when to come back to have them removed. ¨ If you have skin adhesive on the cut, leave it on until it falls off. Skin adhesive is also called glue or liquid stitches. ¨ Change the bandage every day. ¨ Wash the area daily with warm water, and pat it dry. Don't use hydrogen peroxide or alcohol. They can slow healing. ¨ You may cover the area with a gauze bandage if it oozes fluid or rubs against clothing. ¨ You may shower 24 to 48 hours after surgery. Pat the incision dry. Don't swim or take a bath for the first 2 weeks, or until your doctor tells you it is okay. Exercise 
  · Shoulder rehabilitation is a series of exercises you do after your surgery. This helps you get back your shoulder's range of motion and strength. You will work with your doctor and physical therapist to plan this exercise program. To get the best results, you need to do the exercises correctly and as often and as long as your doctor tells you.  
 Ice 
  · For pain, put ice or a cold pack on the area for 10 to 20 minutes at a time. Put a thin cloth between the ice and your skin. Follow-up care is a key part of your treatment and safety. Be sure to make and go to all appointments, and call your doctor if you are having problems. It's also a good idea to know your test results and keep a list of the medicines you take. When should you call for help? Call 911 anytime you think you may need emergency care. For example, call if: 
  · You have severe trouble breathing.  
  · You have symptoms of a blood clot in your lung (called a pulmonary embolism). These may include: 
¨ Sudden chest pain. ¨ Trouble breathing. ¨ Coughing up blood.  
 Call your doctor now or seek immediate medical care if: 
  · You have severe or increasing pain.  
  · You have symptoms of infection, such as: 
¨ Increased pain, swelling, warmth, or redness. ¨ Red streaks or pus. ¨ A fever.  
  · You have tingling, weakness, or numbness in your arm.  
  · Your arm turns cold or changes color.  
  · You have symptoms of a blood clot in your leg (called a deep vein thrombosis). These may include: 
¨ Pain in the calf, back of the knee, thigh, or groin. ¨ Redness and swelling in the leg or groin.  
 Watch closely for changes in your health, and be sure to contact your doctor if:   · You do not get better as expected. Where can you learn more? Go to http://arsenio-iveth.info/. Enter Q815 in the search box to learn more about \"Shoulder Replacement Surgery: What to Expect at Home. \" Current as of: March 21, 2017 Content Version: 11.7 © 5565-0680 [employer]. Care instructions adapted under license by [employer] (which disclaims liability or warranty for this information). If you have questions about a medical condition or this instruction, always ask your healthcare professional. [address] any warranty or liability for your use of this information. Oxycodone/Acetaminophen (By mouth) Acetaminophen (x-ilju-w-MIN-oh-fen), Oxycodone Hydrochloride (wk-s-YYW-done theo-droe-KLOR-tessy) Treats moderate to moderately severe pain. This medicine is a narcotic pain reliever. Brand Name(s): Endocet, Percocet, Primlev, Xartemis XR There may be other brand names for this medicine. When This Medicine Should Not Be Used: This medicine is not right for everyone. Do not use it if you had an allergic reaction to acetaminophen or oxycodone, or if you have serious breathing problems or paralytic ileus. How to Use This Medicine:  
Capsule, Liquid, Tablet, Long Acting Tablet · Your doctor will tell you how much medicine to use. Do not use more than directed. · An overdose can be dangerous. Follow directions carefully so you do not get too much medicine at one time. · Oral liquid: Measure the oral liquid medicine with a marked measuring spoon, oral syringe, or medicine cup. · Swallow the extended-release tablet whole. Do not crush, break, or chew it. Do not lick or wet the tablet before placing it in your mouth. Do not give this medicine through a feeding tube. · This medicine should come with a Medication Guide. Ask your pharmacist for a copy if you do not have one. · Missed dose: If you miss a dose of this medicine, skip the missed dose and go back to your regular dosing schedule. Do not double doses. · Store the medicine in a closed container at room temperature, away from heat, moisture, and direct light. Ask your pharmacist about the best way to dispose of medicine you do not use. Drugs and Foods to Avoid: Ask your doctor or pharmacist before using any other medicine, including over-the-counter medicines, vitamins, and herbal products. · Do not use Xartemis XR if you are using or have used an MAO inhibitor in the past 14 days. · Some medicines can affect how this medicine works. Tell your doctor if you are using any of the following: ¨ Carbamazepine, erythromycin, ketoconazole, lamotrigine, mirtazapine, naltrexone, phenytoin, propranolol, rifampin, ritonavir, tramadol, trazodone, or zidovudine ¨ Birth control pills ¨ Diuretic (water pill) ¨ Medicine to treat depression ¨ Phenothiazine medicine ¨ Triptan medicine to treat migraine headaches · Do not drink alcohol while you are using this medicine. Acetaminophen can damage your liver, and alcohol can increase this risk. Do not take acetaminophen without asking your doctor if you have 3 or more drinks of alcohol every day. · Tell your doctor if you use anything else that makes you sleepy. Some examples are allergy medicine, narcotic pain medicine, and alcohol. Tell your doctor if you are using buprenorphine, butorphanol, nalbuphine, pentazocine, a benzodiazepine, or a muscle relaxer. Warnings While Using This Medicine: · Tell your doctor if you are pregnant or breastfeeding, or if you have kidney disease, liver disease, heart disease, low blood pressure, breathing problems or lung disease (such as asthma, COPD), thyroid problems, Jose disease, pancreas or gallbladder problems, prostate problems, trouble urinating, or a stomach problems, or a history of head injury or brain damage, seizures, or alcohol or drug abuse. Tell your doctor if you are allergic to codeine. · This medicine may cause the following problems: 
¨ High risk of overdose, which can lead to death ¨ Respiratory depression (serious breathing problem that can be life-threatening) ¨ Liver problems ¨ Serious skin reactions ¨ Serotonin syndrome (when used with certain medicines) · This medicine may make you dizzy or drowsy. Do not drive or do anything that could be dangerous until you know how this medicine affects you. Sit or lie down if you feel dizzy. Stand up carefully. · This medicine contains acetaminophen. Read the labels of all other medicines you are using to see if they also contain acetaminophen, or ask your doctor or pharmacist. Katie Mcallister not use more than 4 grams (4,000 milligrams) total of acetaminophen in one day. · This medicine can be habit-forming. Do not use more than your prescribed dose. Call your doctor if you think your medicine is not working. · Do not stop using this medicine suddenly. Your doctor will need to slowly decrease your dose before you stop it completely. · This medicine could cause infertility. Talk with your doctor before using this medicine if you plan to have children. · This medicine may cause constipation, especially with long-term use. Ask your doctor if you should use a laxative to prevent and treat constipation. · Keep all medicine out of the reach of children. Never share your medicine with anyone. Possible Side Effects While Using This Medicine:  
Call your doctor right away if you notice any of these side effects: · Allergic reaction: Itching or hives, swelling in your face or hands, swelling or tingling in your mouth or throat, chest tightness, trouble breathing · Anxiety, restlessness, fast heartbeat, fever, muscle spasms, twitching, diarrhea, seeing or hearing things that are not there · Blistering, peeling, red skin rash · Blue lips, fingernails, or skin · Dark urine or pale stools, loss of appetite, stomach pain, yellow skin or eyes · Extreme weakness, shallow breathing, uneven heartbeat, seizures, sweating, or cold or clammy skin · Severe confusion, lightheadedness, dizziness, or fainting · Severe constipation, nausea, or vomiting · Trouble breathing or slow breathing If you notice these less serious side effects, talk with your doctor:  
· Headache · Mild constipation, nausea, or vomiting · Mild sleepiness or drowsiness If you notice other side effects that you think are caused by this medicine, tell your doctor. Call your doctor for medical advice about side effects. You may report side effects to FDA at 9-793-FDA-2900 © 2017 [address] Information is for End User's use only and may not be sold, redistributed or otherwise used for commercial purposes. The above information is an  only. It is not intended as medical advice for individual conditions or treatments. Talk to your doctor, nurse or pharmacist before following any medical regimen to see if it is safe and effective for you. Please provide this summary of care documentation to your next provider. Signatures-by signing, you are acknowledging that this After Visit Summary has been reviewed with you and you have received a copy. Patient Signature:  ____________________________________________________________ Date:  ____________________________________________________________  
  
Fam Jenkins Provider Signature:  ____________________________________________________________ Date:  ____________________________________________________________

## 2018-08-26 ENCOUNTER — APPOINTMENT (OUTPATIENT)
Dept: GENERAL RADIOLOGY | Age: 73
DRG: 483 | End: 2018-08-26
Attending: ORTHOPAEDIC SURGERY
Payer: MEDICARE

## 2018-08-26 ENCOUNTER — ANESTHESIA EVENT (OUTPATIENT)
Dept: SURGERY | Age: 73
DRG: 483 | End: 2018-08-26
Payer: MEDICARE

## 2018-08-26 ENCOUNTER — ANESTHESIA (OUTPATIENT)
Dept: SURGERY | Age: 73
DRG: 483 | End: 2018-08-26
Payer: MEDICARE

## 2018-08-26 LAB
ANION GAP SERPL CALC-SCNC: 8 MMOL/L (ref 3–18)
BUN SERPL-MCNC: 6 MG/DL (ref 7–18)
BUN/CREAT SERPL: 8 (ref 12–20)
CALCIUM SERPL-MCNC: 8.1 MG/DL (ref 8.5–10.1)
CHLORIDE SERPL-SCNC: 106 MMOL/L (ref 100–108)
CO2 SERPL-SCNC: 27 MMOL/L (ref 21–32)
CREAT SERPL-MCNC: 0.75 MG/DL (ref 0.6–1.3)
GLUCOSE BLD STRIP.AUTO-MCNC: 157 MG/DL (ref 70–110)
GLUCOSE SERPL-MCNC: 103 MG/DL (ref 74–99)
MAGNESIUM SERPL-MCNC: 1.4 MG/DL (ref 1.6–2.6)
POTASSIUM SERPL-SCNC: 3.6 MMOL/L (ref 3.5–5.5)
SODIUM SERPL-SCNC: 141 MMOL/L (ref 136–145)

## 2018-08-26 PROCEDURE — 76060000031 HC ANESTHESIA FIRST 0.5 HR: Performed by: ORTHOPAEDIC SURGERY

## 2018-08-26 PROCEDURE — 77030018836 HC SOL IRR NACL ICUM -A: Performed by: ORTHOPAEDIC SURGERY

## 2018-08-26 PROCEDURE — 74011000258 HC RX REV CODE- 258: Performed by: INTERNAL MEDICINE

## 2018-08-26 PROCEDURE — 36415 COLL VENOUS BLD VENIPUNCTURE: CPT | Performed by: INTERNAL MEDICINE

## 2018-08-26 PROCEDURE — 73030 X-RAY EXAM OF SHOULDER: CPT

## 2018-08-26 PROCEDURE — 77030019950: Performed by: ORTHOPAEDIC SURGERY

## 2018-08-26 PROCEDURE — 74011000250 HC RX REV CODE- 250: Performed by: ORTHOPAEDIC SURGERY

## 2018-08-26 PROCEDURE — 74011250636 HC RX REV CODE- 250/636: Performed by: INTERNAL MEDICINE

## 2018-08-26 PROCEDURE — 77030011216 HC BN CEM CEMEX EXAC -C: Performed by: ORTHOPAEDIC SURGERY

## 2018-08-26 PROCEDURE — 65270000029 HC RM PRIVATE

## 2018-08-26 PROCEDURE — 76060000034 HC ANESTHESIA 1.5 TO 2 HR: Performed by: ORTHOPAEDIC SURGERY

## 2018-08-26 PROCEDURE — 77030003806 HC BIT DRL EXAC -E: Performed by: ORTHOPAEDIC SURGERY

## 2018-08-26 PROCEDURE — 77030003029 HC SUT VCRL J&J -B: Performed by: ORTHOPAEDIC SURGERY

## 2018-08-26 PROCEDURE — 77030002933 HC SUT MCRYL J&J -A: Performed by: ORTHOPAEDIC SURGERY

## 2018-08-26 PROCEDURE — 74011000250 HC RX REV CODE- 250

## 2018-08-26 PROCEDURE — 74011000272 HC RX REV CODE- 272: Performed by: ORTHOPAEDIC SURGERY

## 2018-08-26 PROCEDURE — 0RRJ00Z REPLACEMENT OF RIGHT SHOULDER JOINT WITH REVERSE BALL AND SOCKET SYNTHETIC SUBSTITUTE, OPEN APPROACH: ICD-10-PCS | Performed by: ORTHOPAEDIC SURGERY

## 2018-08-26 PROCEDURE — C1713 ANCHOR/SCREW BN/BN,TIS/BN: HCPCS | Performed by: ORTHOPAEDIC SURGERY

## 2018-08-26 PROCEDURE — 83735 ASSAY OF MAGNESIUM: CPT | Performed by: INTERNAL MEDICINE

## 2018-08-26 PROCEDURE — 74011250637 HC RX REV CODE- 250/637: Performed by: INTERNAL MEDICINE

## 2018-08-26 PROCEDURE — A4565 SLINGS: HCPCS | Performed by: ORTHOPAEDIC SURGERY

## 2018-08-26 PROCEDURE — 77030018846 HC SOL IRR STRL H20 ICUM -A: Performed by: ORTHOPAEDIC SURGERY

## 2018-08-26 PROCEDURE — 74011250636 HC RX REV CODE- 250/636

## 2018-08-26 PROCEDURE — 74011250637 HC RX REV CODE- 250/637: Performed by: ORTHOPAEDIC SURGERY

## 2018-08-26 PROCEDURE — 80048 BASIC METABOLIC PNL TOTAL CA: CPT | Performed by: INTERNAL MEDICINE

## 2018-08-26 PROCEDURE — 76010000154 HC OR TIME FIRST 0.5 HR: Performed by: ORTHOPAEDIC SURGERY

## 2018-08-26 PROCEDURE — 76210000006 HC OR PH I REC 0.5 TO 1 HR: Performed by: ORTHOPAEDIC SURGERY

## 2018-08-26 PROCEDURE — 74011000258 HC RX REV CODE- 258

## 2018-08-26 PROCEDURE — 77030020788: Performed by: ORTHOPAEDIC SURGERY

## 2018-08-26 PROCEDURE — 77030012510 HC MSK AIRWY LMA TELE -B: Performed by: ANESTHESIOLOGY

## 2018-08-26 PROCEDURE — 74011250636 HC RX REV CODE- 250/636: Performed by: ORTHOPAEDIC SURGERY

## 2018-08-26 PROCEDURE — 77030011265 HC ELECTRD BLD HEX COVD -A: Performed by: ORTHOPAEDIC SURGERY

## 2018-08-26 PROCEDURE — C1776 JOINT DEVICE (IMPLANTABLE): HCPCS | Performed by: ORTHOPAEDIC SURGERY

## 2018-08-26 PROCEDURE — 77030032490 HC SLV COMPR SCD KNE COVD -B: Performed by: ORTHOPAEDIC SURGERY

## 2018-08-26 PROCEDURE — 74011250636 HC RX REV CODE- 250/636: Performed by: NURSE ANESTHETIST, CERTIFIED REGISTERED

## 2018-08-26 PROCEDURE — 74011250636 HC RX REV CODE- 250/636: Performed by: PHYSICIAN ASSISTANT

## 2018-08-26 PROCEDURE — 77030034479 HC ADH SKN CLSR PRINEO J&J -B: Performed by: ORTHOPAEDIC SURGERY

## 2018-08-26 PROCEDURE — 77030027671 HC WRE K EXAC -B: Performed by: ORTHOPAEDIC SURGERY

## 2018-08-26 PROCEDURE — 77030034444: Performed by: ORTHOPAEDIC SURGERY

## 2018-08-26 PROCEDURE — 77030031139 HC SUT VCRL2 J&J -A: Performed by: ORTHOPAEDIC SURGERY

## 2018-08-26 PROCEDURE — 77030003028 HC SUT VCRL J&J -A: Performed by: ORTHOPAEDIC SURGERY

## 2018-08-26 PROCEDURE — 77030013079 HC BLNKT BAIR HGGR 3M -A: Performed by: ANESTHESIOLOGY

## 2018-08-26 PROCEDURE — 77030002922 HC SUT FBRWRE ARTH -B: Performed by: ORTHOPAEDIC SURGERY

## 2018-08-26 PROCEDURE — 82962 GLUCOSE BLOOD TEST: CPT

## 2018-08-26 PROCEDURE — 77030006822 HC BLD SAW SAG BRSM -B: Performed by: ORTHOPAEDIC SURGERY

## 2018-08-26 PROCEDURE — 76010000162 HC OR TIME 1.5 TO 2 HR INTENSV-TIER 1: Performed by: ORTHOPAEDIC SURGERY

## 2018-08-26 DEVICE — KIT BONE SCR L18MM DIA4.5MM GLEN SHLDR WHT COMPR LCK CAP RVS: Type: IMPLANTABLE DEVICE | Site: SHOULDER | Status: FUNCTIONAL

## 2018-08-26 DEVICE — IMPLANTABLE DEVICE: Type: IMPLANTABLE DEVICE | Site: SHOULDER | Status: FUNCTIONAL

## 2018-08-26 DEVICE — CEMENT BONE 70GM FULL DOSE POLYMETHYLMETHACRYLATE W/O: Type: IMPLANTABLE DEVICE | Site: SHOULDER | Status: FUNCTIONAL

## 2018-08-26 DEVICE — SCR TORQUE DEFINING KIT -- EQUINOXE: Type: IMPLANTABLE DEVICE | Site: SHOULDER | Status: FUNCTIONAL

## 2018-08-26 DEVICE — KIT BONE SCR L38MM DIA4.5MM GLEN SHLDR GRN COMPR LCK CAP RVS: Type: IMPLANTABLE DEVICE | Site: SHOULDER | Status: FUNCTIONAL

## 2018-08-26 DEVICE — SPHERE GLEN DIA38MM REG STD SHLDR CO CHROM LOK SCR PRI REV: Type: IMPLANTABLE DEVICE | Site: SHOULDER | Status: FUNCTIONAL

## 2018-08-26 DEVICE — KIT BNE SCR L34MM DIA4.5MM GLEN SHLDR RED COMPR LOK CAP REV: Type: IMPLANTABLE DEVICE | Site: SHOULDER | Status: FUNCTIONAL

## 2018-08-26 DEVICE — SCR BNE LCK GLENOSPHERE -- EQUINOXE: Type: IMPLANTABLE DEVICE | Site: SHOULDER | Status: FUNCTIONAL

## 2018-08-26 DEVICE — SHOULDER REV IMPL -- S4 BSV SC: Type: IMPLANTABLE DEVICE | Site: SHOULDER | Status: FUNCTIONAL

## 2018-08-26 RX ORDER — FENTANYL CITRATE 50 UG/ML
INJECTION, SOLUTION INTRAMUSCULAR; INTRAVENOUS AS NEEDED
Status: DISCONTINUED | OUTPATIENT
Start: 2018-08-26 | End: 2018-08-26 | Stop reason: HOSPADM

## 2018-08-26 RX ORDER — METOPROLOL SUCCINATE 50 MG/1
50 TABLET, EXTENDED RELEASE ORAL DAILY
Status: CANCELLED | OUTPATIENT
Start: 2018-08-26

## 2018-08-26 RX ORDER — MIDAZOLAM HYDROCHLORIDE 1 MG/ML
INJECTION, SOLUTION INTRAMUSCULAR; INTRAVENOUS AS NEEDED
Status: DISCONTINUED | OUTPATIENT
Start: 2018-08-26 | End: 2018-08-26 | Stop reason: HOSPADM

## 2018-08-26 RX ORDER — MORPHINE SULFATE 4 MG/ML
INJECTION, SOLUTION INTRAMUSCULAR; INTRAVENOUS
Status: DISPENSED
Start: 2018-08-26 | End: 2018-08-26

## 2018-08-26 RX ORDER — NALOXONE HYDROCHLORIDE 0.4 MG/ML
0.1 INJECTION, SOLUTION INTRAMUSCULAR; INTRAVENOUS; SUBCUTANEOUS AS NEEDED
Status: DISCONTINUED | OUTPATIENT
Start: 2018-08-26 | End: 2018-08-26 | Stop reason: HOSPADM

## 2018-08-26 RX ORDER — FLUMAZENIL 0.1 MG/ML
0.2 INJECTION INTRAVENOUS
Status: DISCONTINUED | OUTPATIENT
Start: 2018-08-26 | End: 2018-08-26 | Stop reason: HOSPADM

## 2018-08-26 RX ORDER — PANTOPRAZOLE SODIUM 40 MG/1
40 TABLET, DELAYED RELEASE ORAL
Status: DISCONTINUED | OUTPATIENT
Start: 2018-08-27 | End: 2018-08-28 | Stop reason: HOSPADM

## 2018-08-26 RX ORDER — OXYCODONE AND ACETAMINOPHEN 5; 325 MG/1; MG/1
1 TABLET ORAL
Status: DISCONTINUED | OUTPATIENT
Start: 2018-08-26 | End: 2018-08-28 | Stop reason: HOSPADM

## 2018-08-26 RX ORDER — NALOXONE HYDROCHLORIDE 0.4 MG/ML
0.4 INJECTION, SOLUTION INTRAMUSCULAR; INTRAVENOUS; SUBCUTANEOUS AS NEEDED
Status: DISCONTINUED | OUTPATIENT
Start: 2018-08-26 | End: 2018-08-28 | Stop reason: HOSPADM

## 2018-08-26 RX ORDER — ASPIRIN 325 MG
325 TABLET, DELAYED RELEASE (ENTERIC COATED) ORAL 2 TIMES DAILY
Status: DISCONTINUED | OUTPATIENT
Start: 2018-08-26 | End: 2018-08-28 | Stop reason: HOSPADM

## 2018-08-26 RX ORDER — POTASSIUM CHLORIDE 20 MEQ/1
20 TABLET, EXTENDED RELEASE ORAL 2 TIMES DAILY
Status: DISCONTINUED | OUTPATIENT
Start: 2018-08-26 | End: 2018-08-28 | Stop reason: HOSPADM

## 2018-08-26 RX ORDER — LIDOCAINE HYDROCHLORIDE 20 MG/ML
INJECTION, SOLUTION EPIDURAL; INFILTRATION; INTRACAUDAL; PERINEURAL AS NEEDED
Status: DISCONTINUED | OUTPATIENT
Start: 2018-08-26 | End: 2018-08-26 | Stop reason: HOSPADM

## 2018-08-26 RX ORDER — SODIUM CHLORIDE 0.9 % (FLUSH) 0.9 %
5-10 SYRINGE (ML) INJECTION AS NEEDED
Status: DISCONTINUED | OUTPATIENT
Start: 2018-08-26 | End: 2018-08-28 | Stop reason: HOSPADM

## 2018-08-26 RX ORDER — ONDANSETRON 2 MG/ML
4 INJECTION INTRAMUSCULAR; INTRAVENOUS ONCE
Status: DISCONTINUED | OUTPATIENT
Start: 2018-08-26 | End: 2018-08-26 | Stop reason: HOSPADM

## 2018-08-26 RX ORDER — PROPOFOL 10 MG/ML
INJECTION, EMULSION INTRAVENOUS AS NEEDED
Status: DISCONTINUED | OUTPATIENT
Start: 2018-08-26 | End: 2018-08-26 | Stop reason: HOSPADM

## 2018-08-26 RX ORDER — FENTANYL CITRATE 50 UG/ML
25 INJECTION, SOLUTION INTRAMUSCULAR; INTRAVENOUS AS NEEDED
Status: DISCONTINUED | OUTPATIENT
Start: 2018-08-26 | End: 2018-08-26 | Stop reason: HOSPADM

## 2018-08-26 RX ORDER — FENTANYL CITRATE 50 UG/ML
INJECTION, SOLUTION INTRAMUSCULAR; INTRAVENOUS
Status: COMPLETED
Start: 2018-08-26 | End: 2018-08-26

## 2018-08-26 RX ORDER — HYDROMORPHONE HYDROCHLORIDE 1 MG/ML
INJECTION, SOLUTION INTRAMUSCULAR; INTRAVENOUS; SUBCUTANEOUS AS NEEDED
Status: DISCONTINUED | OUTPATIENT
Start: 2018-08-26 | End: 2018-08-26 | Stop reason: HOSPADM

## 2018-08-26 RX ORDER — SODIUM CHLORIDE 0.9 % (FLUSH) 0.9 %
5-10 SYRINGE (ML) INJECTION AS NEEDED
Status: DISCONTINUED | OUTPATIENT
Start: 2018-08-26 | End: 2018-08-26 | Stop reason: HOSPADM

## 2018-08-26 RX ORDER — CEFAZOLIN SODIUM 1 G/3ML
INJECTION, POWDER, FOR SOLUTION INTRAMUSCULAR; INTRAVENOUS AS NEEDED
Status: DISCONTINUED | OUTPATIENT
Start: 2018-08-26 | End: 2018-08-26 | Stop reason: HOSPADM

## 2018-08-26 RX ORDER — ONDANSETRON 2 MG/ML
4 INJECTION INTRAMUSCULAR; INTRAVENOUS ONCE
Status: COMPLETED | OUTPATIENT
Start: 2018-08-26 | End: 2018-08-26

## 2018-08-26 RX ORDER — DIPHENHYDRAMINE HCL 25 MG
25 CAPSULE ORAL
Status: DISCONTINUED | OUTPATIENT
Start: 2018-08-26 | End: 2018-08-28 | Stop reason: HOSPADM

## 2018-08-26 RX ORDER — DEXTROSE MONOHYDRATE 25 G/50ML
25-50 INJECTION, SOLUTION INTRAVENOUS AS NEEDED
Status: DISCONTINUED | OUTPATIENT
Start: 2018-08-26 | End: 2018-08-26 | Stop reason: HOSPADM

## 2018-08-26 RX ORDER — INSULIN LISPRO 100 [IU]/ML
INJECTION, SOLUTION INTRAVENOUS; SUBCUTANEOUS ONCE
Status: DISCONTINUED | OUTPATIENT
Start: 2018-08-26 | End: 2018-08-26 | Stop reason: HOSPADM

## 2018-08-26 RX ORDER — DEXTROSE 50 % IN WATER (D50W) INTRAVENOUS SYRINGE
25-50 AS NEEDED
Status: DISCONTINUED | OUTPATIENT
Start: 2018-08-26 | End: 2018-08-26 | Stop reason: HOSPADM

## 2018-08-26 RX ORDER — MAGNESIUM SULFATE 100 %
4 CRYSTALS MISCELLANEOUS AS NEEDED
Status: DISCONTINUED | OUTPATIENT
Start: 2018-08-26 | End: 2018-08-26 | Stop reason: HOSPADM

## 2018-08-26 RX ORDER — SODIUM CHLORIDE 0.9 % (FLUSH) 0.9 %
5-10 SYRINGE (ML) INJECTION EVERY 8 HOURS
Status: DISCONTINUED | OUTPATIENT
Start: 2018-08-26 | End: 2018-08-27

## 2018-08-26 RX ORDER — SIMVASTATIN 40 MG/1
40 TABLET, FILM COATED ORAL
Status: DISCONTINUED | OUTPATIENT
Start: 2018-08-26 | End: 2018-08-28 | Stop reason: HOSPADM

## 2018-08-26 RX ORDER — SODIUM CHLORIDE, SODIUM LACTATE, POTASSIUM CHLORIDE, CALCIUM CHLORIDE 600; 310; 30; 20 MG/100ML; MG/100ML; MG/100ML; MG/100ML
75 INJECTION, SOLUTION INTRAVENOUS CONTINUOUS
Status: DISCONTINUED | OUTPATIENT
Start: 2018-08-26 | End: 2018-08-26 | Stop reason: HOSPADM

## 2018-08-26 RX ORDER — ONDANSETRON 2 MG/ML
INJECTION INTRAMUSCULAR; INTRAVENOUS
Status: COMPLETED
Start: 2018-08-26 | End: 2018-08-26

## 2018-08-26 RX ORDER — EPHEDRINE SULFATE 50 MG/ML
INJECTION, SOLUTION INTRAVENOUS AS NEEDED
Status: DISCONTINUED | OUTPATIENT
Start: 2018-08-26 | End: 2018-08-26 | Stop reason: HOSPADM

## 2018-08-26 RX ORDER — DIPHENHYDRAMINE HYDROCHLORIDE 50 MG/ML
25 INJECTION, SOLUTION INTRAMUSCULAR; INTRAVENOUS
Status: DISCONTINUED | OUTPATIENT
Start: 2018-08-26 | End: 2018-08-28 | Stop reason: HOSPADM

## 2018-08-26 RX ORDER — CALCIUM CARBONATE 500(1250)
500 TABLET ORAL 2 TIMES DAILY WITH MEALS
Status: DISCONTINUED | OUTPATIENT
Start: 2018-08-26 | End: 2018-08-28 | Stop reason: HOSPADM

## 2018-08-26 RX ORDER — CEFAZOLIN SODIUM 2 G/50ML
2 SOLUTION INTRAVENOUS EVERY 8 HOURS
Status: COMPLETED | OUTPATIENT
Start: 2018-08-26 | End: 2018-08-27

## 2018-08-26 RX ORDER — LOSARTAN POTASSIUM 50 MG/1
100 TABLET ORAL DAILY
Status: DISCONTINUED | OUTPATIENT
Start: 2018-08-26 | End: 2018-08-28 | Stop reason: HOSPADM

## 2018-08-26 RX ORDER — ONDANSETRON 2 MG/ML
INJECTION INTRAMUSCULAR; INTRAVENOUS AS NEEDED
Status: DISCONTINUED | OUTPATIENT
Start: 2018-08-26 | End: 2018-08-26 | Stop reason: HOSPADM

## 2018-08-26 RX ORDER — SODIUM CHLORIDE, SODIUM LACTATE, POTASSIUM CHLORIDE, CALCIUM CHLORIDE 600; 310; 30; 20 MG/100ML; MG/100ML; MG/100ML; MG/100ML
INJECTION, SOLUTION INTRAVENOUS
Status: DISCONTINUED | OUTPATIENT
Start: 2018-08-26 | End: 2018-08-26 | Stop reason: HOSPADM

## 2018-08-26 RX ORDER — BUPIVACAINE HYDROCHLORIDE AND EPINEPHRINE 2.5; 5 MG/ML; UG/ML
INJECTION, SOLUTION EPIDURAL; INFILTRATION; INTRACAUDAL; PERINEURAL AS NEEDED
Status: DISCONTINUED | OUTPATIENT
Start: 2018-08-26 | End: 2018-08-26 | Stop reason: HOSPADM

## 2018-08-26 RX ORDER — ONDANSETRON 4 MG/1
4 TABLET, ORALLY DISINTEGRATING ORAL
Status: DISCONTINUED | OUTPATIENT
Start: 2018-08-26 | End: 2018-08-28 | Stop reason: HOSPADM

## 2018-08-26 RX ORDER — MORPHINE SULFATE 4 MG/ML
4 INJECTION, SOLUTION INTRAMUSCULAR; INTRAVENOUS
Status: DISCONTINUED | OUTPATIENT
Start: 2018-08-26 | End: 2018-08-27

## 2018-08-26 RX ADMIN — SODIUM CHLORIDE, SODIUM LACTATE, POTASSIUM CHLORIDE, CALCIUM CHLORIDE: 600; 310; 30; 20 INJECTION, SOLUTION INTRAVENOUS at 15:13

## 2018-08-26 RX ADMIN — Medication 10 ML: at 22:11

## 2018-08-26 RX ADMIN — FENTANYL CITRATE 25 MCG: 50 INJECTION, SOLUTION INTRAMUSCULAR; INTRAVENOUS at 08:54

## 2018-08-26 RX ADMIN — CALCIUM 500 MG: 500 TABLET ORAL at 19:52

## 2018-08-26 RX ADMIN — FENTANYL CITRATE 25 MCG: 50 INJECTION, SOLUTION INTRAMUSCULAR; INTRAVENOUS at 08:44

## 2018-08-26 RX ADMIN — POTASSIUM CHLORIDE 20 MEQ: 20 TABLET, EXTENDED RELEASE ORAL at 19:51

## 2018-08-26 RX ADMIN — MORPHINE SULFATE 4 MG: 4 INJECTION INTRAVENOUS at 06:08

## 2018-08-26 RX ADMIN — OXYCODONE HYDROCHLORIDE AND ACETAMINOPHEN 1 TABLET: 5; 325 TABLET ORAL at 19:52

## 2018-08-26 RX ADMIN — MIDAZOLAM HYDROCHLORIDE 2 MG: 1 INJECTION, SOLUTION INTRAMUSCULAR; INTRAVENOUS at 08:05

## 2018-08-26 RX ADMIN — MORPHINE SULFATE 4 MG: 4 INJECTION, SOLUTION INTRAMUSCULAR; INTRAVENOUS at 11:33

## 2018-08-26 RX ADMIN — MORPHINE SULFATE 4 MG: 4 INJECTION, SOLUTION INTRAMUSCULAR; INTRAVENOUS at 22:10

## 2018-08-26 RX ADMIN — LIDOCAINE HYDROCHLORIDE 20 MG: 20 INJECTION, SOLUTION EPIDURAL; INFILTRATION; INTRACAUDAL; PERINEURAL at 08:10

## 2018-08-26 RX ADMIN — HYDROMORPHONE HYDROCHLORIDE 0.5 MG: 1 INJECTION, SOLUTION INTRAMUSCULAR; INTRAVENOUS; SUBCUTANEOUS at 16:06

## 2018-08-26 RX ADMIN — ONDANSETRON 4 MG: 2 INJECTION INTRAMUSCULAR; INTRAVENOUS at 08:46

## 2018-08-26 RX ADMIN — SIMVASTATIN 40 MG: 40 TABLET, FILM COATED ORAL at 22:10

## 2018-08-26 RX ADMIN — DIPHENHYDRAMINE HYDROCHLORIDE 25 MG: 50 INJECTION, SOLUTION INTRAMUSCULAR; INTRAVENOUS at 22:10

## 2018-08-26 RX ADMIN — EPHEDRINE SULFATE 10 MG: 50 INJECTION, SOLUTION INTRAVENOUS at 15:34

## 2018-08-26 RX ADMIN — SODIUM CHLORIDE, SODIUM LACTATE, POTASSIUM CHLORIDE, AND CALCIUM CHLORIDE 75 ML/HR: 600; 310; 30; 20 INJECTION, SOLUTION INTRAVENOUS at 09:10

## 2018-08-26 RX ADMIN — ASPIRIN 325 MG: 325 TABLET, DELAYED RELEASE ORAL at 19:52

## 2018-08-26 RX ADMIN — FENTANYL CITRATE 25 MCG: 50 INJECTION, SOLUTION INTRAMUSCULAR; INTRAVENOUS at 08:49

## 2018-08-26 RX ADMIN — ONDANSETRON 4 MG: 2 INJECTION, SOLUTION INTRAMUSCULAR; INTRAVENOUS at 08:46

## 2018-08-26 RX ADMIN — ONDANSETRON 4 MG: 2 INJECTION INTRAMUSCULAR; INTRAVENOUS at 15:28

## 2018-08-26 RX ADMIN — PROPOFOL 140 MG: 10 INJECTION, EMULSION INTRAVENOUS at 15:23

## 2018-08-26 RX ADMIN — CEFAZOLIN SODIUM 2 G: 1 INJECTION, POWDER, FOR SOLUTION INTRAMUSCULAR; INTRAVENOUS at 15:25

## 2018-08-26 RX ADMIN — FENTANYL CITRATE 50 MCG: 50 INJECTION, SOLUTION INTRAMUSCULAR; INTRAVENOUS at 15:27

## 2018-08-26 RX ADMIN — FENTANYL CITRATE 100 MCG: 50 INJECTION, SOLUTION INTRAMUSCULAR; INTRAVENOUS at 08:08

## 2018-08-26 RX ADMIN — DEXTROSE MONOHYDRATE AND SODIUM CHLORIDE 75 ML/HR: 5; .9 INJECTION, SOLUTION INTRAVENOUS at 18:32

## 2018-08-26 RX ADMIN — PROPOFOL 50 MG: 10 INJECTION, EMULSION INTRAVENOUS at 08:10

## 2018-08-26 RX ADMIN — EPHEDRINE SULFATE 10 MG: 50 INJECTION, SOLUTION INTRAVENOUS at 16:37

## 2018-08-26 RX ADMIN — FENTANYL CITRATE 50 MCG: 50 INJECTION, SOLUTION INTRAMUSCULAR; INTRAVENOUS at 15:13

## 2018-08-26 RX ADMIN — FENTANYL CITRATE 25 MCG: 50 INJECTION, SOLUTION INTRAMUSCULAR; INTRAVENOUS at 08:59

## 2018-08-26 RX ADMIN — MORPHINE SULFATE 4 MG: 4 INJECTION INTRAVENOUS at 02:38

## 2018-08-26 RX ADMIN — LIDOCAINE HYDROCHLORIDE 40 MG: 20 INJECTION, SOLUTION EPIDURAL; INFILTRATION; INTRACAUDAL; PERINEURAL at 15:23

## 2018-08-26 RX ADMIN — EPHEDRINE SULFATE 10 MG: 50 INJECTION, SOLUTION INTRAVENOUS at 16:15

## 2018-08-26 RX ADMIN — EPHEDRINE SULFATE 10 MG: 50 INJECTION, SOLUTION INTRAVENOUS at 16:52

## 2018-08-26 RX ADMIN — CEFAZOLIN SODIUM 2 G: 2 SOLUTION INTRAVENOUS at 22:10

## 2018-08-26 RX ADMIN — HYDROMORPHONE HYDROCHLORIDE 0.5 MG: 1 INJECTION, SOLUTION INTRAMUSCULAR; INTRAVENOUS; SUBCUTANEOUS at 16:29

## 2018-08-26 NOTE — ANESTHESIA PREPROCEDURE EVALUATION
Anesthetic History   No history of anesthetic complications            Review of Systems / Medical History  Patient summary reviewed, nursing notes reviewed and pertinent labs reviewed    Pulmonary          Smoker         Neuro/Psych   Within defined limits           Cardiovascular    Hypertension: well controlled          Hyperlipidemia         GI/Hepatic/Renal  Within defined limits              Endo/Other  Within defined limits           Other Findings   Comments: Documentation of current medication  Current medications obtained, documented and obtained? YES      Risk Factors for Postoperative nausea/vomiting:       History of postoperative nausea/vomiting? NO       Female? YES       Motion sickness? NO       Intended opioid administration for postoperative analgesia? YES      Smoking Abstinence:  Current Smoker? YES  Elective Surgery? NO  Seen preoperatively by anesthesiologist or proxy prior to day of surgery? YES  Pt abstained from smoking 24 hours prior to anesthesia?  YES    Preventive care/screening for High Blood Pressure:  Aged 18 years and older: YES  Screened for high blood pressure: YES  Patients with high blood pressure referred to primary care provider   for BP management: YES               Physical Exam    Airway  Mallampati: III  TM Distance: 4 - 6 cm  Neck ROM: normal range of motion   Mouth opening: Normal     Cardiovascular  Regular rate and rhythm,  S1 and S2 normal,  no murmur, click, rub, or gallop  Rhythm: regular  Rate: normal         Dental    Dentition: Edentulous, Full upper dentures and Lower partial plate     Pulmonary  Breath sounds clear to auscultation               Abdominal  GI exam deferred       Other Findings            Anesthetic Plan    ASA: 3  Anesthesia type: general          Induction: Intravenous  Anesthetic plan and risks discussed with: Patient

## 2018-08-26 NOTE — CONSULTS
Consult Note        Assessment:    1. Fracture dislocation right shoulder  2. Left 5th proximal phalanx fracture      PLAN:    1. To the OR today for closed reduction right shoulder and application of splint to the left hand. Understanding risks, benefits and alternatives to the procedure she wishes to proceed. Consent signed. NPO         HPI: Gail Washington is a 71 y.o. female patient presents with right shoulder and left hand pain. She had a trip and fall yesterday in her back yard causing the injury. She struck her shoulder on her fire pit. She presented to the ED where xrays revealed a fracture dislocation of her right shoulder and 5th proximal phalanx fracture. She has been admitted for further management. Past Medical History:   Diagnosis Date    Hypertension     Ill-defined condition     Colitis     Past Surgical History:   Procedure Laterality Date    HX BREAST BIOPSY Right 1968    right    HX HYSTERECTOMY      1980's     Prior to Admission medications    Medication Sig Start Date End Date Taking? Authorizing Provider   amlodipine besylate (NORVASC PO) Take 7.5 mg by mouth daily. Yes Historical Provider   losartan (COZAAR) 100 mg tablet Take 100 mg by mouth daily. Indications: HYPERTENSION   Yes Historical Provider   potassium chloride (K-DUR, KLOR-CON) 20 mEq tablet Take 20 mEq by mouth two (2) times a day. Indications: HYPOKALEMIA PREVENTION   Yes Historical Provider   simvastatin (ZOCOR) 40 mg tablet Take 40 mg by mouth nightly. Indications: HYPERCHOLESTEROLEMIA   Yes Historical Provider   calcium carbonate (OS-ERA) 500 mg calcium (1,250 mg) tablet Take  by mouth two (2) times a day. Indications: HYPOCALCEMIA PREVENTION   Yes Historical Provider   omeprazole (PRILOSEC) 20 mg capsule Take 20 mg by mouth daily. Indications: GASTROESOPHAGEAL REFLUX   Yes Historical Provider   metoprolol succinate (TOPROL-XL) 50 mg XL tablet Take 50 mg by mouth daily.     Historical Provider   No Known Allergies   Social History     Social History    Marital status: SINGLE     Spouse name: N/A    Number of children: N/A    Years of education: N/A     Occupational History    Not on file. Social History Main Topics    Smoking status: Current Every Day Smoker     Packs/day: 1.00     Years: 20.00    Smokeless tobacco: Never Used    Alcohol use 1.2 oz/week     2 Glasses of wine per week      Comment: occationally    Drug use: No    Sexual activity: Not on file     Other Topics Concern    Not on file     Social History Narrative       Blood pressure 143/67, pulse 74, temperature 97.7 °F (36.5 °C), resp. rate 15, height 5' 1\" (1.549 m), weight 57.6 kg (127 lb), SpO2 93 %, not currently breastfeeding. CBC w/Diff   Lab Results   Component Value Date/Time    WBC 12.4 08/25/2018 08:50 PM    RBC 4.41 08/25/2018 08:50 PM    HCT 41.2 08/25/2018 08:50 PM          Physical Assessment:  General: in no apparent distress   Extremities:  Right shoulder deformity. Pain with ROM. Elbow motion intact. Will flex and extend the right wrist and digits to command. Swelling and ecchymosis to the left 5th digit. Tender to palpation at the proximal phalanx. Sensation intact to light touch bilat upper extremities    Dressing:  none   DVT Exam:   No exam evidence to suggest DVT. Compartments soft and NT.           Radiology:   Right shoulder: Displaced fracture to the greater tuberosity with glenohumeral joint dislocation    Right hand: Minimally displaced fracture left proximal phalanx          - Tomasz Ward PA-C  8/26/2018  Office [phone] Wzbk [phone]

## 2018-08-26 NOTE — ANESTHESIA PREPROCEDURE EVALUATION
Anesthetic History   No history of anesthetic complications            Review of Systems / Medical History  Patient summary reviewed, nursing notes reviewed and pertinent labs reviewed    Pulmonary  Within defined limits                 Neuro/Psych   Within defined limits           Cardiovascular    Hypertension: well controlled              Exercise tolerance: >4 METS     GI/Hepatic/Renal  Within defined limits              Endo/Other  Within defined limits          Comments: H/o myasthenia gravis. Thymus removed. Other Findings   Comments: Documentation of current medication  Current medications obtained, documented and obtained? YES      Risk Factors for Postoperative nausea/vomiting:       History of postoperative nausea/vomiting? NO       Female? YES       Motion sickness? NO       Intended opioid administration for postoperative analgesia? NO      Smoking Abstinence:  Current Smoker? YES  Elective Surgery? NO  Seen preoperatively by anesthesiologist or proxy prior to day of surgery? YES  Pt abstained from smoking 24 hours prior to anesthesia?  N/A    Preventive care/screening for High Blood Pressure:  Aged 18 years and older: YES  Screened for high blood pressure: YES  Patients with high blood pressure referred to primary care provider   for BP management: YES               Physical Exam    Airway  Mallampati: III  TM Distance: 4 - 6 cm  Neck ROM: normal range of motion   Mouth opening: Normal     Cardiovascular  Regular rate and rhythm,  S1 and S2 normal,  no murmur, click, rub, or gallop  Rhythm: regular  Rate: normal         Dental    Dentition: Edentulous     Pulmonary  Breath sounds clear to auscultation               Abdominal  GI exam deferred       Other Findings            Anesthetic Plan    ASA: 3  Anesthesia type: MAC and general - backup          Induction: Intravenous  Anesthetic plan and risks discussed with: Patient

## 2018-08-26 NOTE — H&P
[facility]  HISTORY AND PHYSICAL      Alex Odom.  MR#: [id number]  : 1945  ACCOUNT #: [de-identified]   ADMIT DATE: 2018    CHIEF COMPLAINT:  Right shoulder, left hand pain. HISTORY OF PRESENT ILLNESS:  This is a 46-year-old female with history of hypertension, high cholesterol, myasthenia gravis among other problems and osteopenia, who fell off of her deck earlier the day of admission, injuring her right arm and left hand. Patient was seen in the ER, she was found to have fracture dislocation of the right humerus head and left 5th proximal phalanx fracture. Patient was referred for admission. Ortho consulted. Surgical intervention recommended. Patient denies syncope. Denies focal neurologic symptoms. She denies any chest pain, palpitation, shortness of breath. PAST MEDICAL HISTORY:  1. Hypertension. 2.  Hypercholesterolemia. 3.  Osteopenia. 4.  Gastroesophageal reflux disease. 5.  Myasthenia gravis-in remission. 6.  Recurrent hypomagnesemia requiring frequent intravenous magnesium infusions. 7.  Lymphocytic colitis-currently in remission. 8.  Mild osteoarthritis. ALLERGIES:  NO KNOWN DRUG ALLERGIES. REGULAR MEDICATIONS:  1. Cozaar 100 mg a day. 2.  Norvasc 7.5 mg a day. 3.  Calcium carbonate twice daily. 4.  Potassium chloride 20 mEq 2 times daily. 5.  Prilosec 20 mg daily. SOCIAL HISTORY:  Patient is a nonsmoker, nondrinker. SHE IS A FULL CODE. She lives alone. FAMILY HISTORY:  Unremarkable and noncontributory to current illness. REVIEW OF SYSTEMS:  Again, patient denies syncope. She denies any focal neurologic symptoms. No chest pain, palpitation, shortness of breath. No abdominal pain, nausea, vomiting and no dysuria, polyuria, hematuria. Rest is as per history of the present illness or unremarkable. PHYSICAL EXAMINATION:  GENERAL:  Pleasant lady in no acute distress.   VITAL SIGNS:  Temperature 97, pulse 78, blood pressure 140/57. HEENT:  Atraumatic, normocephalic. Sclerae are anicteric. NECK:  No venous distention, adenopathy or thyromegaly. LUNGS:  Equal air entry bilaterally, clear to auscultation. HEART:  Regular rhythm. No rubs or gallops appreciated. ABDOMEN:  Soft, nontender, nondistended. Bowel sounds present. No organomegaly, mass or abnormal pulsations. EXTREMITIES:  Lower extremity without clubbing, cyanosis or edema or deformity. The patient does have tenderness over the proximal phalanx, left fifth finger. There is swelling, deformity of the right shoulder area with reduced range of motion. ANCILLARY DATA:  CBC within normal range. Coags, PT/INR normal.  Chemistry borderline elevated blood sugar 124, otherwise unremarkable. Right shoulder x-ray shows comminuted fracture proximal humerus with displaced fragments and head dislocation. Her left hand with a fracture proximal phalanx, left fifth finger. Chest x-ray unremarkable. EKG normal sinus rhythm with bradycardia and possible LVH. ASSESSMENT:  1. Comminuted fracture, right humerus with dislocation of the humeral head. 2.  Left proximal phalanx fracture of the 5th finger. 3.  Hypertension. 4.  Hypercholesterolemia. 5.  Myasthenia gravis. 6.  Recurrent hypomagnesemia. 7.  As per past medical history. PLAN:  The patient is admitted, ortho consult obtained, she had a reduction of the head, but this is not stable and likely will need surgery. Tape of the left fourth and fifth finger was done. Blood pressure medication resumed. Compression stockings for DVT prophylaxis. Check magnesium and replace as needed. Further management accordingly. MD PIPO Odom/  D: 08/26/2018 10:51     T: 08/26/2018 16:34  JOB #: [id number]

## 2018-08-26 NOTE — ED NOTES
TRANSFER - ED to INPATIENT REPORT:    SBAR report made available to receiving floor on this patient being transferred to [address] [place] [address] (2200)  for routine progression of care       Admitting diagnosis Shoulder fracture, right, closed, initial encounter    Information from the following report(s) ED Summary, MAR and Recent Results was made available to receiving floor. Lines:   Peripheral IV 08/25/18 Left Antecubital (Active)   Site Assessment Clean, dry, & intact 8/25/2018  8:58 PM   Phlebitis Assessment 0 8/25/2018  8:58 PM   Infiltration Assessment 0 8/25/2018  8:58 PM   Dressing Status Clean, dry, & intact 8/25/2018  8:58 PM   Dressing Type Transparent 8/25/2018  8:58 PM   Hub Color/Line Status Flushed;Patent 8/25/2018  8:58 PM   Action Taken Blood drawn 8/25/2018  8:58 PM        Medication list updated today    Opportunity for questions and clarification was provided.       Patient is oriented to time, place, person and situation   Patient is  continent and ambulatory with assist     Valuables given to family at patients request     Patient transported with:   [employer]

## 2018-08-26 NOTE — ED NOTES
Patient BP dropped to 62/33. Patient remained alert and oriented. Denies dizziness. 1L NS hung with pressure bag.  Informed UYEN Dooley.

## 2018-08-26 NOTE — PERIOP NOTES
1710  Patient received in PACU and connected to monitors. Vital signs stable. RN at bedside. Will continue to monitor. [id number]  Radiology here to complete post op shoulder xray. Pt tolerated well. [facility] to give report, per Mor Wynne, she will have Barb German RN call back. Pt resting with eyes closed, encouraged to cough and deep breathe. No c/o voiced. [id number]  TRANSFER - OUT REPORT:    Verbal report given to Barb German RN(name) on Jennifer Fuller  being transferred to 2200(unit) for routine post - op       Report consisted of patients Situation, Background, Assessment and   Recommendations(SBAR). Information from the following report(s) SBAR, Kardex, OR Summary, Intake/Output and MAR was reviewed with the receiving nurse. Lines:   Peripheral IV 08/26/18 Left Hand (Active)   Site Assessment Clean, dry, & intact 8/26/2018  5:40 PM   Phlebitis Assessment 0 8/26/2018  5:40 PM   Infiltration Assessment 0 8/26/2018  5:40 PM   Dressing Status Clean, dry, & intact 8/26/2018  5:40 PM   Dressing Type Transparent;Tape;Gauze 8/26/2018  5:40 PM   Hub Color/Line Status Pink; Infusing 8/26/2018  5:40 PM   Action Taken Open ports on tubing capped 8/26/2018  5:40 PM   Alcohol Cap Used Yes 8/26/2018  5:40 PM        Opportunity for questions and clarification was provided.       Patient transported with:   Registered Nurse

## 2018-08-26 NOTE — PROGRESS NOTES
Patient is going to the OR for the second time. I met her sisters, I said we would be in touch with them tomorrow. I briefly discussed snf vs home health. I will leave snf list in pts room. Case Management to follow. Kendall Thapa.  [facility]  Management  [phone], beeper [phone]

## 2018-08-26 NOTE — PROGRESS NOTES
Problem: Falls - Risk of  Goal: *Absence of Falls  Document Shania Fall Risk and appropriate interventions in the flowsheet.   Fall Risk Interventions:            Medication Interventions: Patient to call before getting OOB, Evaluate medications/consider consulting pharmacy, Teach patient to arise slowly    Elimination Interventions: Call light in reach, Patient to call for help with toileting needs, Toileting schedule/hourly rounds, Toilet paper/wipes in reach    History of Falls Interventions: Consult care management for discharge planning, Evaluate medications/consider consulting pharmacy, Door open when patient unattended, Investigate reason for fall

## 2018-08-26 NOTE — ED PROVIDER NOTES
EMERGENCY DEPARTMENT HISTORY AND PHYSICAL EXAM    Date: 8/25/2018  Patient Name: Duncan Houston    History of Presenting Illness     Chief Complaint   Patient presents with    Hand Pain     left    Arm Pain     right         History Provided By: Patient    Chief Complaint: fall, shoulder pain  Duration: 2 Hours  Timing:  Acute  Location: right shoulder, left pinky finger  Quality: Sharp  Severity: 10 out of 10  Modifying Factors: movement makes pain worse  Associated Symptoms: right shoulder pain, left finger pain, abrasion to nose      HPI: Duncan Houston is a 71 y.o. female with a PMH of HTN, Colitis who presents to the ER c/o right shoulder pain s/p mechanical fall while at home tonight. Patient states she was outside tonight, when she tripped and fell towards the fire pit. Patient reports injuring her right shoulder, left finger, and has an abrasion to her nose. Patient denied any LOC from the fall. She rates her arm pain 10/10 and cannot move it due to the pain. She does not take any anticoagulation meds and has no other symptoms or complaints. PCP: Jena Parker MD    Current Facility-Administered Medications   Medication Dose Route Frequency Provider Last Rate Last Dose    morphine injection 4 mg  4 mg IntraVENous Q4H PRN Conchita Gee PA-C        sodium chloride 0.9 % bolus infusion 1,000 mL  1,000 mL IntraVENous ONCE Conchita Gee PA-C         Current Outpatient Prescriptions   Medication Sig Dispense Refill    amlodipine besylate (NORVASC PO) Take 7.5 mg by mouth daily.  losartan (COZAAR) 100 mg tablet Take 100 mg by mouth daily. Indications: HYPERTENSION      potassium chloride (K-DUR, KLOR-CON) 20 mEq tablet Take 20 mEq by mouth two (2) times a day. Indications: HYPOKALEMIA PREVENTION      simvastatin (ZOCOR) 40 mg tablet Take 40 mg by mouth nightly.  Indications: HYPERCHOLESTEROLEMIA      calcium carbonate (OS-ERA) 500 mg calcium (1,250 mg) tablet Take  by mouth two (2) times a day. Indications: HYPOCALCEMIA PREVENTION      omeprazole (PRILOSEC) 20 mg capsule Take 20 mg by mouth daily. Indications: GASTROESOPHAGEAL REFLUX      metoprolol succinate (TOPROL-XL) 50 mg XL tablet Take 50 mg by mouth daily. Past History     Past Medical History:  Past Medical History:   Diagnosis Date    Hypertension     Ill-defined condition     Colitis       Past Surgical History:  Past Surgical History:   Procedure Laterality Date    HX BREAST BIOPSY Right 1968    right    HX HYSTERECTOMY      1980's       Family History:  Family History   Problem Relation Age of Onset    Osteoporosis Mother        Social History:  Social History   Substance Use Topics    Smoking status: Current Every Day Smoker     Packs/day: 1.00     Years: 20.00    Smokeless tobacco: Never Used    Alcohol use 1.2 oz/week     2 Glasses of wine per week      Comment: occationally       Allergies:  No Known Allergies      Review of Systems   Review of Systems   Constitutional: Negative for chills, fatigue and fever. HENT: Negative. Negative for sore throat. Eyes: Negative. Respiratory: Negative for cough and shortness of breath. Cardiovascular: Negative for chest pain and palpitations. Gastrointestinal: Negative for abdominal pain, nausea and vomiting. Genitourinary: Negative for dysuria. Musculoskeletal: Positive for arthralgias. Right shoulder pain, left pinky finger pain   Skin: Positive for wound. Abrasion to nose     Neurological: Negative for dizziness, weakness, light-headedness and headaches. Psychiatric/Behavioral: Negative. All other systems reviewed and are negative. Physical Exam     Vitals:    08/25/18 1944 08/25/18 2052   BP: 139/65 106/48   Pulse: 65    Resp: 16    Temp: 97.7 °F (36.5 °C)    SpO2: 98%    Weight: 57.6 kg (127 lb)    Height: 5' 1\" (1.549 m)      Physical Exam   Constitutional: She is oriented to person, place, and time.  She appears well-developed and well-nourished. She appears distressed. HENT:   Head: Normocephalic. Head is with abrasion. Head is without raccoon's eyes and without Balderas's sign. Right Ear: External ear and ear canal normal.   Left Ear: External ear and ear canal normal.   Nose: Nose normal. No nasal deformity. Mouth/Throat: Uvula is midline and oropharynx is clear and moist.   Abrasion to bridge of nose   Eyes: Conjunctivae are normal. Pupils are equal, round, and reactive to light. No scleral icterus. Neck: Neck supple. No JVD present. No spinous process tenderness present. No tracheal deviation present. Cardiovascular: Normal rate, regular rhythm and normal heart sounds. Pulmonary/Chest: Effort normal and breath sounds normal. No respiratory distress. She has no wheezes. She has no rales. Abdominal: Soft. She exhibits no distension. There is no tenderness. There is no rebound and no guarding. Musculoskeletal:        Right shoulder: She exhibits decreased range of motion, bony tenderness, crepitus, deformity and pain. She exhibits normal pulse. Left hand: She exhibits decreased range of motion, bony tenderness and swelling. She exhibits normal capillary refill. Hands:  Neurological: She is alert and oriented to person, place, and time. She has normal strength. GCS eye subscore is 4. GCS verbal subscore is 5. GCS motor subscore is 6. Skin: Skin is warm and dry. She is not diaphoretic. Psychiatric: She has a normal mood and affect. Nursing note and vitals reviewed.         Diagnostic Study Results     Labs -     Recent Results (from the past 12 hour(s))   CBC WITH AUTOMATED DIFF    Collection Time: 08/25/18  8:50 PM   Result Value Ref Range    WBC 12.4 4.6 - 13.2 K/uL    RBC 4.41 4.20 - 5.30 M/uL    HGB 14.2 12.0 - 16.0 g/dL    HCT 41.2 35.0 - 45.0 %    MCV 93.4 74.0 - 97.0 FL    MCH 32.2 24.0 - 34.0 PG    MCHC 34.5 31.0 - 37.0 g/dL    RDW 12.7 11.6 - 14.5 %    PLATELET 064 566 - 537 K/uL    MPV 10.4 9.2 - 11.8 FL    NEUTROPHILS 74 (H) 40 - 73 %    LYMPHOCYTES 17 (L) 21 - 52 %    MONOCYTES 8 3 - 10 %    EOSINOPHILS 1 0 - 5 %    BASOPHILS 0 0 - 2 %    ABS. NEUTROPHILS 9.1 (H) 1.8 - 8.0 K/UL    ABS. LYMPHOCYTES 2.1 0.9 - 3.6 K/UL    ABS. MONOCYTES 1.0 0.05 - 1.2 K/UL    ABS. EOSINOPHILS 0.1 0.0 - 0.4 K/UL    ABS. BASOPHILS 0.0 0.0 - 0.1 K/UL    DF AUTOMATED     EKG, 12 LEAD, INITIAL    Collection Time: 08/25/18  9:11 PM   Result Value Ref Range    Ventricular Rate 58 BPM    Atrial Rate 58 BPM    P-R Interval 170 ms    QRS Duration 80 ms    Q-T Interval 458 ms    QTC Calculation (Bezet) 449 ms    Calculated P Axis 33 degrees    Calculated R Axis -24 degrees    Calculated T Axis 12 degrees    Diagnosis       Sinus bradycardia  Moderate voltage criteria for LVH, may be normal variant  Borderline ECG  No previous ECGs available         Radiologic Studies -   XR HAND LT MIN 3 V    (Results Pending)   XR SHOULDER RT AP/LAT MIN 2 V    (Results Pending)   XR CHEST PORT    (Results Pending)     CT Results  (Last 48 hours)    None        CXR Results  (Last 48 hours)    None            Medical Decision Making   I am the first provider for this patient. I reviewed the vital signs, available nursing notes, past medical history, past surgical history, family history and social history. Vital Signs-Reviewed the patient's vital signs. Records Reviewed: Nursing Notes, Old Medical Records, Previous Radiology Studies and Previous Laboratory Studies    ED Course:   9:06 PM  70 y/o female c/o right arm pain s/p mechanical fall at home tonight. No LOC and not on any blood thinners. Pt states she tripped and fell, striking fire pit. Obvious fx/DL of right humeral head/proximal shaft with possible avulsion fx noted on xray. Pt also with fx of left 5th prox metatarsal.  Discussed pt with fadia Benson PA. Will add to treatment team and pt admitted to Dr. Claude Ricketts at this time for pain control and medical management as well. Pt made aware of pending admission. Labs, ekg ordered for surgical clearance if needed. Cesilia Durán PA-C      Disposition:  Admitted      Follow-up Information     None          Current Discharge Medication List          Provider Notes (Medical Decision Making):     Procedures:  Procedures        Diagnosis     Clinical Impression:   1. Closed nondisplaced comminuted fracture of shaft of right humerus, initial encounter    2. Fall, initial encounter    3. Abrasion of nose, initial encounter    4. Closed nondisplaced fracture of fifth metatarsal bone of left foot, initial encounter    5.  Shoulder dislocation, right, initial encounter

## 2018-08-26 NOTE — ANESTHESIA POSTPROCEDURE EVALUATION
Post-Anesthesia Evaluation & Assessment    Visit Vitals    /54    Pulse 69    Temp 36.1 °C (97 °F)    Resp 11    Ht 5' 1\" (1.549 m)    Wt 57.6 kg (127 lb)    SpO2 94%    Breastfeeding No    BMI 24 kg/m2       Nausea/Vomiting: no nausea and no vomiting    Pain score (VAS): 0    Post-operative hydration adequate.     Mental status & Level of consciousness: orientation per pre-anesthetic level    Neurological status: moves all extremities, sensation grossly intact    Pulmonary status: airway patent, no supplemental oxygen required    Complications related to anesthesia: none    Additional comments:        Greg Foster CRNA  August 26, 2018

## 2018-08-26 NOTE — PROGRESS NOTES
7039 This RN informed by Pink Brunner RN from PACU that pt will be coming back soon, Pt had closed reduction but was found to have too many broken pieces and so pt will be coming back to surgery by 1500. Dr. Jose Carrera informed patient and pt's sister.  Will keep pt NPO

## 2018-08-26 NOTE — PROGRESS NOTES
2216: Received from ED in wheelchair. Patient oriented to call bell, sister at the bedside, patient DTV, denies urge at this time. 2238: Paged Dr. Jaymie Gomez through the answering service, patient pain is not controlled with morphine, states, \"I had no relief\", patient NPO, no IVF at this time, no VTE prevention. 2255: Spoke with Dr. Jaymie Gomez telephone orders with readback received. 2307: SCDs applied, IVF begun, morphine given, patient declines ice pack. Bedside shift change report given to Esther Reyna RN (oncoming nurse) by Lluvia Nguyen RN (offgoing nurse). Report included the following information SBAR, Kardex, Intake/Output, MAR and Recent Results.

## 2018-08-26 NOTE — PERIOP NOTES
8501  Patient received in PACU after MAC procedure and connected to monitors. Vital signs stable. RN at bedside. Will continue to monitor. 3308  Dr. Tomas Tang at bedside to talk with pt. MD notified pt that she is scheduled for shoulder replacement surgery at 3pm today. Will report this info to floor at handoff. Pt's sister given update per Dr. Tomas Tang. 2182  Medicating per [facility] for c/o pain. 2190 [facility] to give report, RN will call back. 0767  TRANSFER - OUT REPORT:    Verbal report given to [facility], RN(name) on [facility]  being transferred to 2200(unit) for routine post - op       Report consisted of patients Situation, Background, Assessment and   Recommendations(SBAR). Information from the following report(s) SBAR, Kardex, OR Summary, Intake/Output and MAR was reviewed with the receiving nurse. Lines:   Peripheral IV 08/25/18 Left Antecubital (Active)   Site Assessment Clean, dry, & intact 8/26/2018  9:05 AM   Phlebitis Assessment 0 8/26/2018  9:05 AM   Infiltration Assessment 0 8/26/2018  9:05 AM   Dressing Status Clean, dry, & intact 8/26/2018  9:05 AM   Dressing Type Transparent;Tape 8/26/2018  9:05 AM   Hub Color/Line Status Pink;Positional;Infusing 8/26/2018  9:05 AM   Action Taken Open ports on tubing capped 8/26/2018  9:05 AM   Alcohol Cap Used Yes 8/26/2018  9:05 AM        Opportunity for questions and clarification was provided.       Patient transported with:   Registered Nurse

## 2018-08-26 NOTE — PERIOP NOTES
TRANSFER - IN REPORT:    Verbal report received from Orlando Vega on Jennifer Fuller  being received from 2218(unit) for routine progression of care      Report consisted of patients Situation, Background, Assessment and   Recommendations(SBAR). Information from the following report(s) SBAR was reviewed with the receiving nurse. Opportunity for questions and clarification was provided. Assessment completed upon patients arrival to unit and care assumed.

## 2018-08-26 NOTE — BRIEF OP NOTE
BRIEF OPERATIVE NOTE    Date of Procedure: 8/26/2018   Preoperative Diagnosis: Right shoulder fracture   Postoperative Diagnosis: * No post-op diagnosis entered *    Procedure(s):  REVERSE TOTAL SHOULDER ARTHROPLASTY  Surgeon(s) and Role:     * Diana West MD - Primary         Surgical Assistant: see op note    Surgical Staff:  Circ-1: Yvette Drummond  Scrub Tech-1: Pola Juarez  Surg Asst-1: Roberto [facility]  Surg Asst-2: Billy Davis  Event Time In   Incision Start 1544   Incision Close      Anesthesia: General   Estimated Blood Loss: 100 cc  Specimens: * No specimens in log *   Findings: see op note   Complications: none  Implants: * No implants in log *     Home in 1-2 days  Rosario jamil

## 2018-08-26 NOTE — PROGRESS NOTES
0730 Received pt resting in bed, A/O x 4. Arm sling on left shoulder, abrasion on the nose noted, ecchymosis noted on the left fingers. Clear on room air. Ambulated to void the bedside commode. 0740 Pre-op checklist done. Consent checked, signed. Earrings and upper and lower denture sent to security. 0745 To OR    1000 Back to room. 80 Checked consent for another surgery in the afternoon, per pt she needs tot alk to MD again before signing consent. 1500 No consent for another surgery, informed OR nurse. Per Rnm they will get it in the OR.    1515 To OR again for total reverse shoulder arthroplasty. 1815 Back to room. A/O x4, denies any pain at this time. On O2 at 3L/min, pt slightly drowsy. Dressing on right shoulder c/d/i, with sling on, has slight numbness on her right hand, warm to touch, radial pulse palpable. Finger splint on left hand fingers. Abrasion and ecchymosis on the nose noted, POA. IV site no sign of infiltration. Has not voided yet. VS within normal range. 1915 Bedside and Verbal shift change report given to Mauro Vázquez (oncoming nurse) by Kamilla Tucker   (offgoing nurse). Report included the following information SBAR, Kardex, Intake/Output and MAR.

## 2018-08-26 NOTE — PERIOP NOTES
TRANSFER - IN REPORT:    Verbal report received from Gaurav Medina on Jose Trivedi  being received from 2218 (unit) for routine progression of care      Report consisted of patients Situation, Background, Assessment and   Recommendations(SBAR). Information from the following report(s) SBAR was reviewed with the receiving nurse. Opportunity for questions and clarification was provided. Dr. Vito Reeves was at bedside to talk to patient and her family       Assessment completed upon patients arrival to unit and care assumed.

## 2018-08-26 NOTE — BRIEF OP NOTE
BRIEF OPERATIVE NOTE    Date of Procedure: 8/26/2018   Preoperative Diagnosis: Dislocated right shoulder  Postoperative Diagnosis: Dislocated right shoulder , fracture of the humeral head  Procedure(s):  CLOSED REDUCTION RIGHT SHOULDER  Surgeon(s) and Role:     * Karen Ashley MD - Primary         Surgical Assistant: staff    Surgical Staff:  Circ-1: Andrzej Gallagher  Radiology Technician: Carmela Mccarty  Scrub Tech-1: Lucero Calix  Surg Asst-1: Amaya Read  Event Time In   Incision Start 3689   Incision Close 0815     Anesthesia: General   Estimated Blood Loss: none  Specimens: * No specimens in log *   Findings: see op note   Complications: none  Implants: * No implants in log *     Plan   Return to the OR later today for Reverse TSA  NPO

## 2018-08-27 PROBLEM — I10 HTN (HYPERTENSION): Status: ACTIVE | Noted: 2018-08-27

## 2018-08-27 PROBLEM — S43.004A SHOULDER DISLOCATION, RIGHT, INITIAL ENCOUNTER: Status: ACTIVE | Noted: 2018-08-27

## 2018-08-27 PROBLEM — S62.619A FRACTURE OF PROXIMAL PHALANX OF LEFT HAND: Status: ACTIVE | Noted: 2018-08-27

## 2018-08-27 PROBLEM — G70.00 MYASTHENIA GRAVIS (HCC): Status: ACTIVE | Noted: 2018-08-27

## 2018-08-27 LAB
ATRIAL RATE: 58 BPM
CALCULATED P AXIS, ECG09: 33 DEGREES
CALCULATED R AXIS, ECG10: -24 DEGREES
CALCULATED T AXIS, ECG11: 12 DEGREES
DIAGNOSIS, 93000: NORMAL
P-R INTERVAL, ECG05: 170 MS
Q-T INTERVAL, ECG07: 458 MS
QRS DURATION, ECG06: 80 MS
QTC CALCULATION (BEZET), ECG08: 449 MS
VENTRICULAR RATE, ECG03: 58 BPM

## 2018-08-27 PROCEDURE — 74011250637 HC RX REV CODE- 250/637: Performed by: ORTHOPAEDIC SURGERY

## 2018-08-27 PROCEDURE — 74011250636 HC RX REV CODE- 250/636: Performed by: ORTHOPAEDIC SURGERY

## 2018-08-27 PROCEDURE — 65270000029 HC RM PRIVATE

## 2018-08-27 PROCEDURE — 74011250636 HC RX REV CODE- 250/636: Performed by: INTERNAL MEDICINE

## 2018-08-27 PROCEDURE — 74011000258 HC RX REV CODE- 258: Performed by: INTERNAL MEDICINE

## 2018-08-27 PROCEDURE — 74011250637 HC RX REV CODE- 250/637: Performed by: INTERNAL MEDICINE

## 2018-08-27 PROCEDURE — 97165 OT EVAL LOW COMPLEX 30 MIN: CPT

## 2018-08-27 PROCEDURE — 97162 PT EVAL MOD COMPLEX 30 MIN: CPT

## 2018-08-27 RX ORDER — MAGNESIUM SULFATE IN 0.9% NACL 2 G/100 ML
2 INTRAVENOUS SOLUTION, PIGGYBACK (ML) INTRAVENOUS ONCE
Status: DISCONTINUED | OUTPATIENT
Start: 2018-08-27 | End: 2018-08-27 | Stop reason: CLARIF

## 2018-08-27 RX ORDER — MAGNESIUM SULFATE HEPTAHYDRATE 40 MG/ML
2 INJECTION, SOLUTION INTRAVENOUS ONCE
Status: COMPLETED | OUTPATIENT
Start: 2018-08-27 | End: 2018-08-27

## 2018-08-27 RX ADMIN — CALCIUM 500 MG: 500 TABLET ORAL at 16:28

## 2018-08-27 RX ADMIN — AMLODIPINE BESYLATE 7.5 MG: 5 TABLET ORAL at 08:43

## 2018-08-27 RX ADMIN — DIPHENHYDRAMINE HYDROCHLORIDE 25 MG: 50 INJECTION, SOLUTION INTRAMUSCULAR; INTRAVENOUS at 02:40

## 2018-08-27 RX ADMIN — CEFAZOLIN SODIUM 2 G: 2 SOLUTION INTRAVENOUS at 15:06

## 2018-08-27 RX ADMIN — OXYCODONE HYDROCHLORIDE AND ACETAMINOPHEN 1 TABLET: 5; 325 TABLET ORAL at 13:21

## 2018-08-27 RX ADMIN — PANTOPRAZOLE SODIUM 40 MG: 40 TABLET, DELAYED RELEASE ORAL at 08:43

## 2018-08-27 RX ADMIN — SIMVASTATIN 40 MG: 40 TABLET, FILM COATED ORAL at 21:40

## 2018-08-27 RX ADMIN — OXYCODONE HYDROCHLORIDE AND ACETAMINOPHEN 1 TABLET: 5; 325 TABLET ORAL at 21:40

## 2018-08-27 RX ADMIN — POTASSIUM CHLORIDE 20 MEQ: 20 TABLET, EXTENDED RELEASE ORAL at 08:44

## 2018-08-27 RX ADMIN — CEFAZOLIN SODIUM 2 G: 2 SOLUTION INTRAVENOUS at 05:42

## 2018-08-27 RX ADMIN — DEXTROSE MONOHYDRATE AND SODIUM CHLORIDE 75 ML/HR: 5; .9 INJECTION, SOLUTION INTRAVENOUS at 05:42

## 2018-08-27 RX ADMIN — OXYCODONE HYDROCHLORIDE AND ACETAMINOPHEN 1 TABLET: 5; 325 TABLET ORAL at 17:45

## 2018-08-27 RX ADMIN — MAGNESIUM SULFATE HEPTAHYDRATE 2 G: 40 INJECTION, SOLUTION INTRAVENOUS at 09:22

## 2018-08-27 RX ADMIN — ASPIRIN 325 MG: 325 TABLET, DELAYED RELEASE ORAL at 17:45

## 2018-08-27 RX ADMIN — POTASSIUM CHLORIDE 20 MEQ: 20 TABLET, EXTENDED RELEASE ORAL at 17:45

## 2018-08-27 RX ADMIN — MORPHINE SULFATE 4 MG: 4 INJECTION, SOLUTION INTRAMUSCULAR; INTRAVENOUS at 02:40

## 2018-08-27 RX ADMIN — CALCIUM 500 MG: 500 TABLET ORAL at 08:45

## 2018-08-27 RX ADMIN — LOSARTAN POTASSIUM 100 MG: 50 TABLET ORAL at 08:44

## 2018-08-27 RX ADMIN — Medication 10 ML: at 05:42

## 2018-08-27 RX ADMIN — ASPIRIN 325 MG: 325 TABLET, DELAYED RELEASE ORAL at 08:44

## 2018-08-27 RX ADMIN — OXYCODONE HYDROCHLORIDE AND ACETAMINOPHEN 1 TABLET: 5; 325 TABLET ORAL at 05:42

## 2018-08-27 RX ADMIN — OXYCODONE HYDROCHLORIDE AND ACETAMINOPHEN 1 TABLET: 5; 325 TABLET ORAL at 09:23

## 2018-08-27 NOTE — PROGRESS NOTES
Bedside shift change report given to Denisse Mclain RN (oncoming nurse) by Aracelis Newby RN (offgoing nurse). Report included the following information SBAR, OR Summary, Intake/Output, MAR and Recent Results. 1955: Repositioned patient in bed, due meds given, Percocet given, ice applied to shoulder, patient in good spirits states, \"My pain is so much better after surgery\". Dressing is C/D/I.     0240: Ambulated in room with minimal assistance, voided, dressing C/D/I, returned to bed, morphine given, ice applied. Bedside shift change report given to Joaan Ram RN (oncoming nurse) by Denisse Mclain RN (offgoing nurse). Report included the following information SBAR, Kardex, OR Summary, Intake/Output, MAR and Recent Results.

## 2018-08-27 NOTE — PROGRESS NOTES
conducted an initial consultation and Spiritual Assessment for Salvador Hernandez, who is a 71 y.o.,female. Patients Primary Language is: [place]. According to the patients EMR Gnosticism Affiliation is: Denominational. The reason the Patient came to the hospital is:   Patient Active Problem List    Diagnosis Date Noted    Shoulder dislocation, right, initial encounter 08/27/2018    Fracture of proximal phalanx of left hand 08/27/2018    HTN (hypertension) 08/27/2018    Myasthenia gravis ([facility] [address] 08/27/2018    Shoulder fracture, right, closed, initial encounter 08/25/2018        The  provided the following Interventions:  Initiated a relationship of care and support with patient and family member. Listened empathically as patient shared her story of being here and having to have emergency surgery on a busted shoulder. Patient seems to be in good spirits but still has some pain at present. Provided information about Spiritual Care Services. Offered prayer and assurance of continued prayers on patients behalf. The following outcomes were achieved:  Patient shared limited information about her medical narrative and spiritual journey. .  Patient processed feeling about current hospitalization. Patient expressed gratitude for pastoral care visit. Assessment:  Patient does not have any Baptist/cultural needs that will affect patients preferences in health care. There are no further spiritual or Baptist issues which require Spiritual Care Services interventions at this time. Plan:  Chaplains will continue to follow and will provide pastoral care on an as needed/requested basis    . [facility]   Spiritual Care   [phone]

## 2018-08-27 NOTE — OP NOTES
[address] REPORT    Tina Millan  MR#: [id number]  : 1945  ACCOUNT #: [de-identified]   DATE OF SERVICE: 2018    PREOPERATIVE DIAGNOSES:    1. Right shoulder fracture dislocation. 2.  Left fifth digit proximal phalanx fracture. POSTOPERATIVE DIAGNOSES:  1. Right shoulder fracture dislocation. 2.  Left fifth digit proximal phalanx fracture. PROCEDURES PERFORMED:    1. Right shoulder fracture dislocation reduction. 2.  Closed reduction and splinting of a left fifth digit fracture. COMPLICATIONS:  None. ESTIMATED BLOOD LOSS:  None. ANESTHESIA:  General.    SURGEON:  Sujit Louis MD    ASSISTANT:  staff    SPECIMENS REMOVED:  none    IMPLANTS:  none    INDICATION:  A 71-year-old female with ground level fall with a fracture dislocation of her right shoulder presents for right shoulder closed reduction. Risks of surgery including infection, bleeding, damage to nerves and blood vessels, need for future surgery, inability to get a closed reduction and the need for open reduction was discussed. She desires to proceed. PROCEDURE IN DETAIL:  She was taken to the OR where general anesthetic was given. Manipulation of the small finger was performed and it was Cob[place] to the ring finger. Overall longitudinal and rotational alignment were anatomic. Next, a longitudinal traction of the shoulder was performed and the humeral head was brought back into the glenohumeral joint. Under fluoroscopic visualization, there was found to be a surgical neck fracture as well as a lesser tuberosity fracture, consistent with a 4-part proximal humerus fracture, which was not readily apparent on the original x-ray. It was felt that due to the severity of her fracture that ORIF was not amendable, but that she would need a shoulder arthroplasty. She was awoken and spoken to in PACU. Discussed arthroplasty and she would like to proceed down this route.   We will try and do this later this afternoon. We will again speak with her then. There were no complications. She was returned to PACU in stable condition.       Jeimy Macedo MD CM / Dimas Acosta  D: 08/26/2018 11:10     T: 08/27/2018 08:14  JOB #: [id number]

## 2018-08-27 NOTE — PROGRESS NOTES
Problem: Falls - Risk of  Goal: *Absence of Falls  Document Shania Fall Risk and appropriate interventions in the flowsheet.    Outcome: Progressing Towards Goal  Fall Risk Interventions:            Medication Interventions: Patient to call before getting OOB, Teach patient to arise slowly    Elimination Interventions: Call light in reach, Patient to call for help with toileting needs, Toileting schedule/hourly rounds    History of Falls Interventions: Consult care management for discharge planning, Investigate reason for fall, Room close to nurse's station

## 2018-08-27 NOTE — PROGRESS NOTES
General Internal Medicine/Geriatrics    Patient: Tana Carvalho MRN: [id number]  CSN: [id number]    YOB: 1945  Age: 71 y.o. Sex: female    DOA: 8/25/2018 LOS:  LOS: 2 days                    Subjective: Tolerated surgery well  No CP, SOB  Still with Rt. Shoulder pain    Review of Systems:  Eyes: negative  Ears, Nose, Mouth, Throat, and Face: negative  Respiratory: negative for dyspnea on exertion  Cardiovascular: negative for chest pain  Gastrointestinal: negative for nausea, vomiting and diarrhea  Genitourinary:negative for dysuria and hematuria  Integument/Breast: negative  Hematologic/Lymphatic: negative for bleeding  Musculoskeletal:negative for arthralgias  Neurological: negative for weakness  Behavioral/Psychiatric: negative for behavior problems  Endocrine: negative for temperature intolerance  Allergic/Immunologic: negative for hay fever    Objective:     Physical Exam:  Patient Vitals for the past 24 hrs:   Temp Pulse Resp BP SpO2   08/27/18 0529 98 °F (36.7 °C) 79 14 152/65 90 %   08/27/18 0007 97.8 °F (36.6 °C) 74 15 129/63 92 %   08/26/18 2025 97.4 °F (36.3 °C) 79 14 134/61 97 %   08/26/18 1814 97.8 °F (36.6 °C) 83 12 136/60 94 %   08/26/18 1750 - 80 12 135/46 95 %   08/26/18 1740 - 85 10 129/50 90 %   08/26/18 1725 - 83 10 121/46 100 %   08/26/18 1720 - 90 11 126/48 100 %   08/26/18 1715 - 96 13 123/43 100 %   08/26/18 1712 - 96 11 - 96 %   08/26/18 1711 - (!) 101 16 - -   08/26/18 1710 97.8 °F (36.6 °C) 78 12 119/45 100 %   08/26/18 1433 98.3 °F (36.8 °C) 77 14 157/64 94 %   08/26/18 0944 97.8 °F (36.6 °C) 69 12 149/67 94 %   08/26/18 0916 - 68 11 129/57 98 %   08/26/18 0905 - 69 11 122/53 94 %     AF, VSS  HEENT: wnl  NECK:  No venous distention or adenopathy   HEART: RRR, no rubs or gallops  LUNGS: Clear to auscultation  ABDOMEN: soft with active BS.  No tenderness, no distention, no organomegaly  EXT: warm,no edema  SKIN: Normal turgor, no breaks  NEURO: Awake, alert, non focal    Intake and Output:  Current Shift:     Last three shifts:  08/25 1901 - 08/27 0700  In: 3078.8 [P.O.:480; I.V.:2598.8]  Out: 4250 [Urine:4150]    Recent Results (from the past 24 hour(s))   METABOLIC PANEL, BASIC    Collection Time: 08/26/18 10:10 AM   Result Value Ref Range    Sodium 141 136 - 145 mmol/L    Potassium 3.6 3.5 - 5.5 mmol/L    Chloride 106 100 - 108 mmol/L    CO2 27 21 - 32 mmol/L    Anion gap 8 3.0 - 18 mmol/L    Glucose 103 (H) 74 - 99 mg/dL    BUN 6 (L) 7.0 - 18 MG/DL    Creatinine 0.75 0.6 - 1.3 MG/DL    BUN/Creatinine ratio 8 (L) 12 - 20      GFR est AA >60 >60 ml/min/1.73m2    GFR est non-AA >60 >60 ml/min/1.73m2    Calcium 8.1 (L) 8.5 - 10.1 MG/DL   MAGNESIUM    Collection Time: 08/26/18 10:10 AM   Result Value Ref Range    Magnesium 1.4 (L) 1.6 - 2.6 mg/dL   GLUCOSE, POC    Collection Time: 08/26/18  6:47 PM   Result Value Ref Range    Glucose (POC) 157 (H) 70 - 110 mg/dL       Xr Shoulder Rt Ap/lat Min 2 V    Result Date: 8/27/2018  EXAM: Right shoulder 2 views INDICATION: Postop. Proximal right humerus fracture COMPARISON: Intraoperative images right shoulder 8/26/2015. Right shoulder series 8/25/2018 _______________ FINDINGS: Portable AP and transthoracic views were performed of the right shoulder. Patient is status post reverse shoulder arthroplasty. Humeral and acetabular components appear to be in good position. There is relative lucency involving the proximal humerus. _______________     IMPRESSION: 1. Status post right shoulder arthroplasty with good position of the components.        Current Facility-Administered Medications   Medication Dose Route Frequency    magnesium sulfate 2 g/100 ml 0.9% NS IVPB premix  2 g IntraVENous ONCE    amLODIPine (NORVASC) tablet 7.5 mg  7.5 mg Oral DAILY    calcium carbonate (OS-ERA) tablet 500 mg [elemental]  500 mg Oral BID WITH MEALS    losartan (COZAAR) tablet 100 mg  100 mg Oral DAILY    pantoprazole (PROTONIX) tablet 40 mg  40 mg Oral ACB    potassium chloride (K-DUR, KLOR-CON) SR tablet 20 mEq  20 mEq Oral BID    simvastatin (ZOCOR) tablet 40 mg  40 mg Oral QHS    sodium chloride (NS) flush 5-10 mL  5-10 mL IntraVENous Q8H    sodium chloride (NS) flush 5-10 mL  5-10 mL IntraVENous PRN    naloxone (NARCAN) injection 0.4 mg  0.4 mg IntraVENous PRN    aspirin delayed-release tablet 325 mg  325 mg Oral BID    diphenhydrAMINE (BENADRYL) injection 25 mg  25 mg IntraVENous Q4H PRN    diphenhydrAMINE (BENADRYL) capsule 25 mg  25 mg Oral Q4H PRN    ceFAZolin (ANCEF) 2g IVPB in 50 mL D5W  2 g IntraVENous Q8H    oxyCODONE-acetaminophen (PERCOCET) 5-325 mg per tablet 1 Tab  1 Tab Oral Q4H PRN    ondansetron (ZOFRAN ODT) tablet 4 mg  4 mg Oral Q8H PRN       Lab Results   Component Value Date/Time    Glucose 103 (H) 08/26/2018 10:10 AM    Glucose 124 (H) 08/25/2018 08:50 PM    Glucose 71 (L) 10/26/2010 05:02 PM    Glucose 118 (H) 03/08/2010 04:56 PM    Glucose 87 12/08/2009 05:27 PM        Assessment     Active Problems:    Shoulder fracture, right, closed, initial encounter (8/25/2018)      Shoulder dislocation, right, initial encounter (8/27/2018)      Fracture of proximal phalanx of left hand (8/27/2018)      HTN (hypertension) (8/27/2018)      Myasthenia gravis ([facility] [address] (8/27/2018)    Hypomagnesemia    Fall    Plan     DC IV fluids  PT, OT  Pain control  Replace Mg.   Continue other meds        Luis Flores MD  8/27/2018, 8:57 AM

## 2018-08-27 NOTE — PROGRESS NOTES
Progress Note      Post Operative Day: 1    Assessment: 1. Right shoulder replacement    PLAN:    1. Mobilize. 2. Sling, no shoulder motion  3. Discharge Planning home tomorrow           HPI: Mariana Guillory is a 71 y.o. female patient without new complaints  for Shoulder fracture, right, closed, initial encounter  Dislocated right shoulder  Right shoulder fracture  8/25/2018. No new orthopaedic changes. Blood pressure 151/63, pulse 78, temperature 98.4 °F (36.9 °C), resp. rate 14, height 5' 1\" (1.549 m), weight 57.6 kg (127 lb), SpO2 90 %, not currently breastfeeding. CBC w/Diff   Lab Results   Component Value Date/Time    WBC 12.4 08/25/2018 08:50 PM    RBC 4.41 08/25/2018 08:50 PM    HCT 41.2 08/25/2018 08:50 PM          Physical Assessment:  General: in no apparent distress   Extremities:  Neurovascular intact    Dressing:  Mild serous drainage   DVT Exam:   No exam evidence to suggest DVT. Compartments soft and NT. Radiology: Portable AP and transthoracic views were performed of the right shoulder. Patient is status post reverse shoulder arthroplasty. Humeral and acetabular  components appear to be in good position.  There is relative lucency involving  the proximal humerus.         - Mariah Thornton PA-C  8/27/2018  Office [phone] Eplv [phone]

## 2018-08-27 NOTE — OP NOTES
[address] REPORT    Mari Lee  MR#: [id number]  : 1945  ACCOUNT #: [de-identified]   DATE OF SERVICE: 2018    SURGEON:  Niki Davies MD    ASSISTANT:  Staff    PREOPERATIVE DIAGNOSIS:  Right 4-part proximal humerus fracture. POSTOPERATIVE DIAGNOSIS:  Right 4-part proximal humerus fracture. PROCEDURE PERFORMED:  Right reverse total shoulder arthroplasty for fracture. COMPLICATIONS:  None. ESTIMATED BLOOD LOSS:  100 mL. ANESTHESIA:  General.    SPECIMENS REMOVED:  none     HARDWARE USED:  Equinoxe 6.5 mm cemented platform fracture stem, glenosphere 38 mm +0 humeral liner, +5 mm humeral adapter tray. IMPLANTS:  See above    INDICATIONS:  A 67-year-old female who had a fracture dislocation of her shoulder. Had a closed reduction earlier today with completion of her anatomic neck fracture. She presents for reverse total shoulder arthroplasty. Risks of surgery including infection, bleeding, damage to nerves and blood vessels, need for revision operation, nonunion, malunion, no improvement in her symptoms and dislocation was discussed. She desires to proceed. PROCEDURE IN DETAIL:  After informed consent, the patient was taken to the operating room and placed on the operating table. After adequate sedation, she was intubated. She was positioned in beachchair position with all bony prominences well padded. Appropriate timeout was taken. The deltopectoral approach to the shoulder was performed, carried down through skin and subcutaneous tissues, careful to protect the cephalic vein, retracted laterally with the deltoid. Underlying clavipectoral fascia was incised along the lateral border of the coracobrachialis tendon, exposing the proximal humerus. The humeral head was found to be in the axilla was easily removed.   The biceps tendon was then found within the bicipital groove, was split within the rotator interval easily taken off the lesser tuberosity due to the fracture fragments. The lesser tuberosity was then tagged for possible later repair. Three FiberTapes were placed into the posterior rotator cuff and the superior rotator cuff was removed. The glenoid was then exposed. All paralabral tissue was removed. Center-center position of the glenoid was found, a guide pin was placed exiting at Matsen's point. Once it exited at Matsen's point, it was then reamed to a 38 mm and then a central drill hole was made. The glenoid baseplate was inserted and secured with 3 peripheral screws with locking caps inferior to the equator. The glenosphere was then inserted with a locking screw. Attention was then turned to the humerus. It was reamed to a size 7 mm, found to have a very tight canal.  Once it was reamed, bone was placed into the canal at the level of the cement mantle. It was trialed with a standard 6.5 mm stem, was able to be reduced and therefore a 6.5 mm platform fracture stem was cemented into position 20 degrees of retroversion. It was then trialed again, found to have a better overall tissue tension with +5 mm buildup. Wound was then copiously irrigated. Trial components were removed. Final components were inserted after breaking off the torque to find screw. The greater tuberosity was bone graft into the anatomic position, secured with FiberTapes. A final FiberTape which had been placed through the shaft was then brought up over securing it further. Internal and external rotation demonstrates no gapping at the greater tuberosity fragments. Wound was then copiously irrigated with Betadine and saline solution. A 0 Vicryl was used to close the deltopectoral interval, 2-0 Vicryl, subcutaneous tissues, 4-0 Monocryl for subcuticular closure and Prineo was used for the skin. She was placed in a sling returned to the PACU in stable condition. There were no complications.       Deanna Monsivais MD CM / MN  D: 08/26/2018 16:48     T: 08/27/2018 10:11  JOB #: [id number]

## 2018-08-27 NOTE — PROGRESS NOTES
Physical Exam   Skin:             Bedside and Verbal shift change report given to Mary Limon RN (oncoming nurse) by Meeta Klein RN (offgoing nurse). Report included the following information SBAR, Kardex, Intake/Output and MAR.     9756 - prn meds administered for pain rated 3/10. Ambulated pt to bathroom. Pt had steady gait & required minimal assistance to get OOB. Output recorded. 1007 - pain reassessment performed. Pt states \"pain is still there. It never goes away. \"     [phone] - prn meds administered for pain rated 4/10. Will cont to monitor. 1628 - pt c/o pain at shoulder. Ice packs given to pt. Attempted repositioning but pt states \"no position is comfortable\". Will cont to monitor. 1745 - prn meds administered for pain rated 6/10. Pt states ice pack helped \"a little\" but still feeling stiff & aching pain in shoulder. Will cont to monitor. 1932 - Bedside and Verbal shift change report given to Jorge A Tinsley RN (oncoming nurse) by Mary Limon RN (offgoing nurse). Report included the following information SBAR, Kardex, Intake/Output and MAR.

## 2018-08-27 NOTE — PROGRESS NOTES
Problem: Mobility Impaired (Adult and Pediatric)  Goal: *Acute Goals and Plan of Care (Insert Text)  Physical Therapy Goals  Initiated 8/27/2018 and to be accomplished within 7 day(s)  1. Patient will move from supine to sit and sit to supine  in bed with modified independence. 2.  Patient will transfer from bed to chair and chair to bed with modified independence using the least restrictive device. 3.  Patient will perform sit to stand with modified independence. 4.  Patient will ambulate with modified independence for 150 feet with the least restrictive device. 5.  Patient will ascend/descend 10 stairs with handrail(s) with modified independence. Outcome: Progressing Towards Goal  PHYSICAL THERAPY: Initial Assessment   INPATIENT: Medicare: Hospital Day: 3     Patient: Francesco Marin (71 y.o. female)    Date: 8/27/2018  Primary Diagnosis: Shoulder fracture, right, closed, initial encounter  Dislocated right shoulder  Right shoulder fracture    Procedure(s) (LRB):  REVERSE TOTAL SHOULDER ARTHROPLASTY (Right), 1 Day Post-Op   Precautions: Fall  PLOF: Independent    ASSESSMENT :  Patient requires between modified independence and supervision/set-up for bed mobility, transfers and ambulation. Seated on bedside commode with ANNABELLE Stevens. Mod I for sit to stand. Seated EOB mod I with good sitting balance. BLE strength 5/5. RUE in sling. Agreeable to amb in room with max encouragment. Amb 20ft with supervision and no AD. Steady gait. Refuses stairs this session d/t RUE pain. Returned to bed mod I. Discharge rec to home. Will follow up to trial stairs. Independent prior to admission. Patient presents with deficits in:  Bed Mobility, Transfers, Gait, Strength, Balance and Stairs    Patient will benefit from skilled intervention to address the above impairments.   Patients rehabilitation potential is considered to be Good  Factors which may influence rehabilitation potential include:   []         None noted  []         Mental ability/status  [x]         Medical condition  []         Home/family situation and support systems  []         Safety awareness  [x]         Pain tolerance/management  []         Other:      PLAN :  Recommendations and Planned Interventions:  x           Bed Mobility Training             x    Neuromuscular Re-Education  x          Transfer Training                   -    Orthotic/Prosthetic Training  x          Gait Training                          -    Modalities  x           Therapeutic Exercises          -    Edema Management/Control  x           Therapeutic Activities            x    Patient and Family Training/Education  -           Other (comment):      EDUCATION:   Education:  Patient was educated on the following topics: Bed mobility, transfers, ADLs, balance, amb, safety, exercise, role of PT, plan of care, cognition, skin integrity, vitals      Barriers to Learning/Limitations: None  Compensate with: visual, verbal, tactile, kinesthetic cues/model    Recommendations for the next treatment session: Stairs  Frequency/Duration: Patient will be followed by physical therapy 1-2 times a week to address goals. Discharge Recommendations: None  Further Equipment Recommendations for Discharge: N/A     SUBJECTIVE:   Patient stated I'm in severe pain.     OBJECTIVE DATA SUMMARY:     Past Medical History:   Diagnosis Date    Hypertension     Ill-defined condition     Colitis     Past Surgical History:   Procedure Laterality Date    HX BREAST BIOPSY Right 1968    right    HX HYSTERECTOMY      1980's     Eval Complexity: History: MEDIUM  Complexity : 1-2 comorbidities / personal factors will impact the outcome/ POC Exam:MEDIUM Complexity : 3 Standardized tests and measures addressing body structure, function, activity limitation and / or participation in recreation  Presentation: MEDIUM Complexity : Evolving with changing characteristics  Clinical Decision Making:Medium Complexity [place] Standing Balance Scale 4/5 Overall Complexity:MEDIUM    G CODES:Mobility [id number] Current  CJ= 20-39%  [id number] Goal  CI= 1-19%. The severity rating is based on the Other [facility] Balance Scale 4/5    [facility] Standing Balance Scale 4/5  0: Pt performs 25% or less of standing activity (Max assist) CN, 100% impaired. 1: Pt supports self with upper extremities but requires therapist assistance. Pt performs 25-50% of effort (Mod assist) CM, 80% to <100% impaired. 1+: Pt supports self with upper extremities but requires therapist assistance. Pt performs >50% effort. (Min assist). CL, 60% to <80% impaired. 2: Pt supports self independently with both upper extremities (walker, crutches, parallel bars). CL, 60% to <80% impaired. 2+: Pt support self independently with 1 upper extremity (cane, crutch, 1 parallel bar). CK, 40% to <60% impaired. 3: Pt stands without upper extremity support for up to 30 seconds. CK, 40% to <60% impaired. 3+: Pt stands without upper extremity support for 30 seconds or greater. CJ, 20% to <40% impaired. 4: Pt independently moves and returns center of gravity 1-2 inches in one plane. CJ, 20% to <40% impaired. 4+: Pt independently moves and returns center of gravity 1-2 inches in multiple planes. CI, 1% to <20% impaired. 5: Pt independently moves and returns center of gravity in all planes greater than 2 inches. CH, 0% impaired.     Prior Level of Function/Home Situation: Independent  Home Situation  Home Environment: Private residence  # Steps to Enter: 6  Rails to Enter: Yes  One/Two Story Residence: Two story  Interior Rails: Left  Living Alone: Yes  Support Systems: Family member(s)  Patient Expects to be Discharged to[de-identified] Private residence  Current DME Used/Available at Home: None  Critical Behavior:  Neurologic State: Alert  Orientation Level: Oriented X4  Cognition: Follows commands  Safety/Judgement: Fall prevention  Psychosocial  Patient Behaviors: Cooperative    Manual Muscle Testing (LE)         R     L    Hip Flexion:   5/5 5/5  Knee EXT:   5/5 5/5  Knee FLEX:   5/5 5/5  Ankle DF:   5/5 5/5  _________________________________________________   Tone : BLE normal  Sensation: Intact to light touch BLE  Range Of Motion:BLE AROM WFL    Functional Mobility:      Functional Status     Indep   (I)   Mod I   Super-vision   Min A   Mod A   Max A   Total A   Assist x2 Verbal cues Additional time Not tested   Comments   Rolling []  []  [] []    []    []  []  [] [] [] [x]    Supine to sit []  []  [] []  []  []  []  [] [] [] [x]    Sit to supine []  [x]  [] []  []  []  []  [] [] [] []    Sit to stand []  [x]  [] []  []  []  []  [] [] [] []    Stand to sit []  [x]  [] []  []  []  []  [] [] [] []    Bed to chair transfers []  []  [] []  []  []  []  [] [] [] [x]        Balance    Good   Fair   Poor   Unable   Not tested   Comments   Sitting static [x]  []  []  []  []    Sitting dynamic [x]  []  []  []  []    Standing static [x]  []  []  []  []    Standing dynamic [x]  []  []  []  []        Mobility/Gait:   Level of Assistance: Supervision  Assistive Device: none  Distance Ambulated: 20 feet       Stairs: refused d/t pain    Therapeutic Exercises:   Sit to stand x2    Pain:  Pre treatment pain level: 3  Post treatment pain level: 3  Pain Scale 1: Numeric (0 - 10)  Pain Intensity 1: 3  Pain Location 1: Shoulder  Pain Orientation 1: Right  Pain Description 1: Aching  Pain Intervention(s) 1: Medication (see MAR)    Activity Tolerance:   Good  Please refer to the flowsheet for vital signs taken during this treatment.   After treatment:   []         Patient left in no apparent distress sitting up in chair  [x]         Patient left in no apparent distress in bed  [x]         Call bell left within reach  [x]         Nursing notified  []         Caregiver present  []         Bed alarm activated    COMMUNICATION/EDUCATION:   [x]         Fall prevention education was provided and the patient/caregiver indicated understanding. [x]         Patient/family have participated as able in goal setting and plan of care. [x]         Patient/family agree to work toward stated goals and plan of care. []         Patient understands intent and goals of therapy, but is neutral about his/her participation. []         Patient is unable to participate in goal setting and plan of care.     Thank you for this referral.  Latoya Mccoy, PT   Time Calculation: 10 mins

## 2018-08-27 NOTE — PROGRESS NOTES
Problem: Self Care Deficits Care Plan (Adult)  Goal: *Acute Goals and Plan of Care (Insert Text)  Outcome: Resolved/Met Date Met: 08/27/18  [address]  Occupational THERAPY: Eval/discharge   INPATIENT: Medicare: Hospital Day: 3     Patient: Salvador Hernandez (60 y.o. female)    Date: 8/27/2018  Primary Diagnosis: Shoulder fracture, right, closed, initial encounter  Dislocated right shoulder  Right shoulder fracture    Procedure(s) (LRB):  REVERSE TOTAL SHOULDER ARTHROPLASTY (Right), 1 Day Post-Op,   Precautions: Falls; RUE sling  NWB RUE  PLOF: Pt was independent with basic self care tasks and functional mobility PTA. ASSESSMENT:   Based on the objective data described below, the patient presents with minimal assistance for ADLs secondary to R shoulder fx. Pt supine on arrival, c/o 4/10 pain in R shoulder, agreeable to therapy. Marlen Narvaez RN provided pt with pain meds. Supervision for bed mobility. Max A to readjust sling. Pt educated on AROM of R forearm, wrist & digits to maintain ROM and promote fluid movement to minimize edema. Pt educated on adaptive strategies for ADLs involving UB dressing, bathing, and toileting; pt verbalized understanding and reported she has good support from family upon transition home. Recommend shower chair to ensure safety during bathing. Pt educated on home safety; she verbalized understanding. Skilled therapy is not indicated in this setting. Recommend outpatient once cleared to begin mobilizing RUE. Pt left supine with needs within reach. Skilled occupational therapy is not indicated at this time. EDUCATION     Education:  Patient was educated on the following topics: ROM of R wrist/digits to promote fluid movement and minimize swelling  Barriers to Learning/Limitations: None  Compensate with: visual, verbal, tactile, kinesthetic cues/model     PLAN OF CARE:    Skilled therapy not indicated at this time.      Discharge Recommendations: Outpatient (once cleared to begin mobilizing RUE)    Further Equipment Recommendations for Discharge: shower chair (for safety)SUBJECTIVE:  Patient stated: \"It was just a freak accident\"    OBJECTIVE/TREATMENT:     Past Medical History:   Diagnosis Date    Hypertension     Ill-defined condition     Colitis     Past Surgical History:   Procedure Laterality Date    HX BREAST BIOPSY Right 1968    right    HX HYSTERECTOMY      1980's      Eval Complexity: History: LOW Complexity : Brief history review ; Examination: LOW Complexity : 1-3 performance deficits relating to physical, cognitive , or psychosocial skils that result in activity limitations and / or participation restrictions ; Decision Making:LOW Complexity : No comorbidities that affect functional and no verbal or physical assistance needed to complete eval tasks     G CODES: Self Care  Current  CJ= 20-39%   Goal  CJ= 20-39%   D/C  CJ= 20-39%.   The severity rating is based on the Other Functional Assessment, MMT, ROM    Prior Level of Function/Home Situation:   Fully active; able to carry on all performance without restriction  Lives with Family  none reported    Cognitive/Behavioral Status:   Neurologic State: alert   Orientation: oriented to time, place, person and situation  Cognition:   appropriate decision making, appropriate for age attention/concentration, appropriate safety awareness and following commands  follows multi-step complex commands/direction   Safety/Judgement: Awareness of environment and Fall prevention    ROM: Grossly decreased (RUE; immobile secondary to shoulder fx); LUE WFL  MMT: RUE n/t secondary to shoulder flex; LUE WFL  Coordination: BUEs WFL  Hand dominance: Right    Skin: Intact (BUEs)  Edema: None noted (BUEs)  Sensation: Intact (BUEs)    Vision/Perceptual: normal      Functional Status      Indep   Mod I   Sup. /  Set- Up    SBA   CGA   Min Assist   Mod Assist   Max assist   Total Assist   Assist x2    Additional Time   NT   Comments Rolling []  []  [x]  []  []    []    []    []  []  []  []  []     Supine to sit []  []  [x]  []  []  []  []  []  []  []  []  []     Sit to supine []  []  [x]  []  []  []  []  []  []  []  []  []     Sit to stand []  []  []  []  []  []  []  []  []  []  []  [x]     Toilet Transfer []  []  [x]  []  []  []  []  []  []  []  []  []                     Feeding []  []  [x]  []  []  [x]  []  []  []  []  []  []     Grooming []  []  [x]  []  []  [x]  []  []  []  []  []  []     Bathing  []  []  []  []  []  [x]  []  []  []  []  []  []     UB Dressing  []  []  []  []  []  [x]  []  []  []  []  []  []     LB Dressing  []  []  []  []  []  [x]  []  []  []  []  []  []     Toileting []  []  []  []  []  [x]  []  []  []  []  []  []         Balance    Good   Fair   Poor   Unable   Comments   Sitting static [x]  []  []  []     Sitting dynamic [x]  []  []  []     Standing static []  []  []  []  n/t   Standing dynamic []  []  []  []  n/t     Therapeutic Exercise:   AROM, forearm supination, forearm pronation, wrist flexion, wrist extension, wrist circles, digit flexion, digit abduction, thumb flexion, thumb opposition and thumb abduction      Pain:   Pre treatment:  4/10  Post treatment: 4/10  Scale: numeric   Location: R shoulder fx    Activity tolerance:  fair+    COMMUNICATION/EDUCATION: Pt educated on role of OT, POC and home safety. She verbalized understanding. [x]         Fall prevention education was provided and the patient/caregiver indicated understanding. [x]         Patient/family have participated as able in goal setting and plan of care. [x]         Patient/family agree to work toward stated goals and plan of care.   []         Patient understands intent and goals of therapy, but is neutral about his/her participation     The patients plan of care was also discussed with: Physical Therapist.     · After treatment position/precautions:   o Supine in bed  o Call light within reach  o RN notified     Thank you for this referral.  Ronda Rosen, MS OTR/L  Time Calculation: 17 mins

## 2018-08-27 NOTE — PROGRESS NOTES
Problem: Falls - Risk of  Goal: *Absence of Falls  Document Shania Fall Risk and appropriate interventions in the flowsheet.    Outcome: Progressing Towards Goal  Fall Risk Interventions:            Medication Interventions: Evaluate medications/consider consulting pharmacy, Patient to call before getting OOB, Teach patient to arise slowly    Elimination Interventions: Call light in reach, Patient to call for help with toileting needs, Toileting schedule/hourly rounds, Toilet paper/wipes in reach, Elevated toilet seat    History of Falls Interventions: Door open when patient unattended, Investigate reason for fall

## 2018-08-28 VITALS
RESPIRATION RATE: 16 BRPM | HEART RATE: 77 BPM | DIASTOLIC BLOOD PRESSURE: 65 MMHG | OXYGEN SATURATION: 92 % | SYSTOLIC BLOOD PRESSURE: 148 MMHG | BODY MASS INDEX: 23.98 KG/M2 | HEIGHT: 61 IN | WEIGHT: 127 LBS | TEMPERATURE: 98.5 F

## 2018-08-28 LAB
ANION GAP SERPL CALC-SCNC: 6 MMOL/L (ref 3–18)
BASOPHILS # BLD: 0 K/UL (ref 0–0.1)
BASOPHILS NFR BLD: 0 % (ref 0–2)
BUN SERPL-MCNC: 6 MG/DL (ref 7–18)
BUN/CREAT SERPL: 8 (ref 12–20)
CALCIUM SERPL-MCNC: 8.1 MG/DL (ref 8.5–10.1)
CHLORIDE SERPL-SCNC: 109 MMOL/L (ref 100–108)
CO2 SERPL-SCNC: 28 MMOL/L (ref 21–32)
CREAT SERPL-MCNC: 0.72 MG/DL (ref 0.6–1.3)
DIFFERENTIAL METHOD BLD: ABNORMAL
EOSINOPHIL # BLD: 0.1 K/UL (ref 0–0.4)
EOSINOPHIL NFR BLD: 1 % (ref 0–5)
ERYTHROCYTE [DISTWIDTH] IN BLOOD BY AUTOMATED COUNT: 13 % (ref 11.6–14.5)
GLUCOSE SERPL-MCNC: 97 MG/DL (ref 74–99)
HCT VFR BLD AUTO: 34.1 % (ref 35–45)
HGB BLD-MCNC: 11.3 G/DL (ref 12–16)
LYMPHOCYTES # BLD: 1.2 K/UL (ref 0.9–3.6)
LYMPHOCYTES NFR BLD: 11 % (ref 21–52)
MAGNESIUM SERPL-MCNC: 2.2 MG/DL (ref 1.6–2.6)
MCH RBC QN AUTO: 31.4 PG (ref 24–34)
MCHC RBC AUTO-ENTMCNC: 33.1 G/DL (ref 31–37)
MCV RBC AUTO: 94.7 FL (ref 74–97)
MONOCYTES # BLD: 1.2 K/UL (ref 0.05–1.2)
MONOCYTES NFR BLD: 11 % (ref 3–10)
NEUTS SEG # BLD: 8.3 K/UL (ref 1.8–8)
NEUTS SEG NFR BLD: 77 % (ref 40–73)
PLATELET # BLD AUTO: 203 K/UL (ref 135–420)
PMV BLD AUTO: 10.7 FL (ref 9.2–11.8)
POTASSIUM SERPL-SCNC: 4 MMOL/L (ref 3.5–5.5)
RBC # BLD AUTO: 3.6 M/UL (ref 4.2–5.3)
SODIUM SERPL-SCNC: 143 MMOL/L (ref 136–145)
WBC # BLD AUTO: 10.8 K/UL (ref 4.6–13.2)

## 2018-08-28 PROCEDURE — 80048 BASIC METABOLIC PNL TOTAL CA: CPT | Performed by: INTERNAL MEDICINE

## 2018-08-28 PROCEDURE — 83735 ASSAY OF MAGNESIUM: CPT | Performed by: INTERNAL MEDICINE

## 2018-08-28 PROCEDURE — 74011250637 HC RX REV CODE- 250/637: Performed by: ORTHOPAEDIC SURGERY

## 2018-08-28 PROCEDURE — 85025 COMPLETE CBC W/AUTO DIFF WBC: CPT | Performed by: INTERNAL MEDICINE

## 2018-08-28 PROCEDURE — 74011250637 HC RX REV CODE- 250/637: Performed by: INTERNAL MEDICINE

## 2018-08-28 PROCEDURE — 36415 COLL VENOUS BLD VENIPUNCTURE: CPT | Performed by: INTERNAL MEDICINE

## 2018-08-28 RX ORDER — OXYCODONE AND ACETAMINOPHEN 5; 325 MG/1; MG/1
1 TABLET ORAL
Qty: 20 TAB | Refills: 0 | Status: SHIPPED | OUTPATIENT
Start: 2018-08-28 | End: 2019-09-09

## 2018-08-28 RX ADMIN — PANTOPRAZOLE SODIUM 40 MG: 40 TABLET, DELAYED RELEASE ORAL at 06:31

## 2018-08-28 RX ADMIN — AMLODIPINE BESYLATE 7.5 MG: 5 TABLET ORAL at 08:02

## 2018-08-28 RX ADMIN — OXYCODONE HYDROCHLORIDE AND ACETAMINOPHEN 1 TABLET: 5; 325 TABLET ORAL at 06:28

## 2018-08-28 RX ADMIN — LOSARTAN POTASSIUM 100 MG: 50 TABLET ORAL at 08:02

## 2018-08-28 RX ADMIN — OXYCODONE HYDROCHLORIDE AND ACETAMINOPHEN 1 TABLET: 5; 325 TABLET ORAL at 10:24

## 2018-08-28 RX ADMIN — OXYCODONE HYDROCHLORIDE AND ACETAMINOPHEN 1 TABLET: 5; 325 TABLET ORAL at 02:08

## 2018-08-28 RX ADMIN — ASPIRIN 325 MG: 325 TABLET, DELAYED RELEASE ORAL at 08:02

## 2018-08-28 RX ADMIN — CALCIUM 500 MG: 500 TABLET ORAL at 08:02

## 2018-08-28 RX ADMIN — POTASSIUM CHLORIDE 20 MEQ: 20 TABLET, EXTENDED RELEASE ORAL at 08:02

## 2018-08-28 NOTE — PROGRESS NOTES
Physical Exam   Skin:             0715 - Bedside and Verbal shift change report given to Marylou Browning RN (oncoming nurse) by Thomasenia Castleman, RN (offgoing nurse). Report included the following information SBAR, Kardex, Intake/Output and MAR. Pt c/o no pain at this time & denies n/v. Will cont to monitor. 8974 - AM meds administered, VS entered (taken by LPN student), will cont to monitor.

## 2018-08-28 NOTE — PROGRESS NOTES
Problem: Falls - Risk of  Goal: *Absence of Falls  Document Shania Fall Risk and appropriate interventions in the flowsheet.    Outcome: Progressing Towards Goal  Fall Risk Interventions:  Mobility Interventions: Communicate number of staff needed for ambulation/transfer, Patient to call before getting OOB, Utilize walker, cane, or other assistive device         Medication Interventions: Patient to call before getting OOB, Evaluate medications/consider consulting pharmacy, Teach patient to arise slowly    Elimination Interventions: Call light in reach, Patient to call for help with toileting needs    History of Falls Interventions: Door open when patient unattended, Evaluate medications/consider consulting pharmacy

## 2018-08-28 NOTE — PROGRESS NOTES
1936 Received bedside and verbal shift change report from [place], RN report included the following information SBAR, Kardex, Intake/Output and MAR. Pt laying in the watching tv, a&ox4 denied acute distress at this time. Right arm in sling circulation intact. Stable call bell within reach. 2140 Medicated for pain and assisted to bathroom voided and tolerated with minimal discomfort. Back to bed and resting call bell within reach. 2334 Pt remain stable voiced no complaint in the bed resting quietly call bell within reach. 0208 Medicated for pain and assisted to bedside commode tolerated, back to bed resting call bell within reach will continue to monitor. 9411 Bedside and Verbal shift change report given to ANNABELLE Ovlales (oncoming nurse) by Betty Cooper RN (offgoing nurse). Report included the following information SBAR, Kardex, Intake/Output and MAR.

## 2018-08-28 NOTE — PROGRESS NOTES
Progress Note Post Operative Day: 2 Assessment: 1. Status post reverse total shoulder PLAN:   
1. Mobilize. 2. No shoulder motion, continue sling 3. Discharge Planning f/u in the office with Dr Denisse Campos in 10-14 days HPI: Sydney Brunner is a 71 y.o. female patient without new complaints status post TSA for Shoulder fracture, right, closed, initial encounter Dislocated right shoulder Right shoulder fracture  8/25/2018. No new orthopaedic changes. Blood pressure 148/65, pulse 77, temperature 98.5 °F (36.9 °C), resp. rate 16, height 5' 1\" (1.549 m), weight 57.6 kg (127 lb), SpO2 92 %, not currently breastfeeding. CBC w/Diff Lab Results Component Value Date/Time WBC 10.8 08/28/2018 05:25 AM  
 RBC 3.60 (L) 08/28/2018 05:25 AM  
 HCT 34.1 (L) 08/28/2018 05:25 AM  
  
 
 
Physical Assessment: 
General: in no apparent distress Extremities:  Motors intact, decreased sensation to light touch right hand Dressing:  mild serous drainage DVT Exam:   No exam evidence to suggest DVT. Compartments soft and NT.  
  
 
    
 
- Mariah Thornton PA-C 
8/28/2018 Office [phone] Cell [phone]

## 2018-08-28 NOTE — ANESTHESIA POSTPROCEDURE EVALUATION
Post-Anesthesia Evaluation and Assessment    Patient: Radha Hernandez MRN: [id number]  SSN: [id number]    YOB: 1945  Age: 71 y.o. Sex: female       Cardiovascular Function/Vital Signs  Visit Vitals    /61 (BP 1 Location: Left arm, BP Patient Position: At rest)    Pulse 74    Temp 36.8 °C (98.2 °F)    Resp 16    Ht 5' 1\" (1.549 m)    Wt 57.6 kg (127 lb)    SpO2 92%    Breastfeeding No    BMI 24 kg/m2       Patient is status post general anesthesia for Procedure(s):  REVERSE TOTAL SHOULDER ARTHROPLASTY. Nausea/Vomiting: None    Postoperative hydration reviewed and adequate. Pain:  Pain Scale 1: Numeric (0 - 10) (08/28/18 0628)  Pain Intensity 1: 5 (08/28/18 5262)   Managed    Neurological Status:   Neuro (WDL): Within Defined Limits (08/26/18 1740)  Neuro  Neurologic State: Alert (08/27/18 2005)  Orientation Level: Oriented X4 (08/27/18 2005)  Cognition: Appropriate decision making; Follows commands (08/27/18 2005)  RUE Motor Response: Purposeful;Weak;Spontaneous  (08/27/18 2005)   At baseline    Mental Status and Level of Consciousness: Arousable    Pulmonary Status:   O2 Device: Room air (08/27/18 2005)   Adequate oxygenation and airway patent    Complications related to anesthesia: None    Post-anesthesia assessment completed.  No concerns      Signed By: Rashi Stovall MD     August 28, 2018

## 2018-08-28 NOTE — DISCHARGE INSTRUCTIONS
DISCHARGE SUMMARY from Nurse    PATIENT INSTRUCTIONS:    After general anesthesia or intravenous sedation, for 24 hours or while taking prescription Narcotics:  · Limit your activities  · Do not drive and operate hazardous machinery  · Do not make important personal or business decisions  · Do  not drink alcoholic beverages  · If you have not urinated within 8 hours after discharge, please contact your surgeon on call. Report the following to your surgeon:  · Excessive pain, swelling, redness or odor of or around the surgical area  · Temperature over 100.5  · Nausea and vomiting lasting longer than 4 hours or if unable to take medications  · Any signs of decreased circulation or nerve impairment to extremity: change in color, persistent  numbness, tingling, coldness or increase pain  · Any questions    What to do at Home:  Recommended activity: Activity as tolerated, no shoulder movement, wear sling. If you experience any of the symptoms above, please follow up with Dr. Francesca Montoya. *  Please give a list of your current medications to your Primary Care Provider. *  Please update this list whenever your medications are discontinued, doses are      changed, or new medications (including over-the-counter products) are added. *  Please carry medication information at all times in case of emergency situations. These are general instructions for a healthy lifestyle:    No smoking/ No tobacco products/ Avoid exposure to second hand smoke  Surgeon General's Warning:  Quitting smoking now greatly reduces serious risk to your health.     Obesity, smoking, and sedentary lifestyle greatly increases your risk for illness    A healthy diet, regular physical exercise & weight monitoring are important for maintaining a healthy lifestyle    You may be retaining fluid if you have a history of heart failure or if you experience any of the following symptoms:  Weight gain of 3 pounds or more overnight or 5 pounds in a week, increased swelling in our hands or feet or shortness of breath while lying flat in bed. Please call your doctor as soon as you notice any of these symptoms; do not wait until your next office visit. Recognize signs and symptoms of STROKE:    F-face looks uneven    A-arms unable to move or move unevenly    S-speech slurred or non-existent    T-time-call 911 as soon as signs and symptoms begin-DO NOT go       Back to bed or wait to see if you get better-TIME IS BRAIN. Warning Signs of HEART ATTACK     Call 911 if you have these symptoms:   Chest discomfort. Most heart attacks involve discomfort in the center of the chest that lasts more than a few minutes, or that goes away and comes back. It can feel like uncomfortable pressure, squeezing, fullness, or pain.  Discomfort in other areas of the upper body. Symptoms can include pain or discomfort in one or both arms, the back, neck, jaw, or stomach.  Shortness of breath with or without chest discomfort.  Other signs may include breaking out in a cold sweat, nausea, or lightheadedness. Don't wait more than five minutes to call 911 - MINUTES MATTER! Fast action can save your life. Calling 911 is almost always the fastest way to get lifesaving treatment. Emergency Medical Services staff can begin treatment when they arrive -- up to an hour sooner than if someone gets to the hospital by car. The discharge information has been reviewed with the patient. The patient verbalized understanding. Discharge medications reviewed with the patient and appropriate educational materials and side effects teaching were provided. Patient armband removed and shredded.   ___________________________________________________________________________________________________________________________________

## 2018-08-28 NOTE — PROGRESS NOTES
Care Management Interventions PCP Verified by CM: No 
Palliative Care Criteria Met (RRAT>21 & CHF Dx)?: No 
Mode of Transport at Discharge: Other (see comment) (home) Hospital Transport Time of Discharge: 1000 Transition of Care Consult (CM Consult): Other (home) MyChart Signup: No 
Discharge Durable Medical Equipment: No 
Health Maintenance Reviewed: Yes Physical Therapy Consult: No 
Occupational Therapy Consult: No 
Speech Therapy Consult: No 
Current Support Network: Other (home) Confirm Follow Up Transport: Family Plan discussed with Pt/Family/Caregiver: No 
[employer] Information Provided?: No 
Discharge Location Discharge Placement: Home FOLLOW UP VISIT Appointment in: Other (Specify) Surgery as directed Our office as scheduled (Order #[id number]) on 8/28/18

## 2018-08-29 NOTE — DISCHARGE SUMMARY
[address] [place]  MR#: [id number]  : 1945  ACCOUNT #: [de-identified]   ADMIT DATE: 2018  DISCHARGE DATE: 2018    DISCHARGE DIAGNOSES:  1. Acute traumatic closed right shoulder dislocation and right proximal humeral head fracture. 2.  Closed reduction of dislocation followed by open reduction internal fixation, right shoulder arthroplasty. 3.  Left fifth proximal phalanx fracture. 4.  Hypomagnesemia. 5.  Hypertension. 6.  Nonsyncopal fall. 7.  As per past medical history. DISCHARGE MEDICATIONS:  Patient will resume all preadmit medication in addition to the following:  Percocet 5/325 one every 4-6 hours as needed, given #20. DISPOSITION:  Home. FOLLOWUP:  With Dr. Trista Perdomo as per his direction and in our office as scheduled. REASON FOR ADMISSION:  A 16-year-old female with the above problems, had a nonsyncopal fall, sustained a right shoulder dislocation and proximal humeral head fracture in addition to left 5th finger proximal phalanx fracture. For details, see admission history and physical.    The patient was admitted. Pain control was achieved by parenteral morphine. Gentle hydration was provided. She had low magnesium which was replaced IV. She was put back on her usual medications including antihypertensives, etc.  DVT prophylaxis was provided with compression stockings. She was seen by orthopedic, initially underwent closed reduction of the dislocation. Subsequently, underwent right total shoulder arthroplasty. She remained stable from the hemodynamic and cardiopulmonary standpoint. Patient is currently felt to have achieved maximum benefits of hospital stay and will be discharged for followup as outpatient as above. MD PIPO Carreno/MN  D: 2018 09:41     T: 2018 00:08  JOB #: [id number]

## 2018-10-23 ENCOUNTER — HOSPITAL ENCOUNTER (OUTPATIENT)
Dept: INFUSION THERAPY | Age: 73
Discharge: HOME | End: 2018-10-23
Payer: MEDICARE

## 2018-10-23 VITALS
OXYGEN SATURATION: 97 % | TEMPERATURE: 97.4 F | HEART RATE: 76 BPM | SYSTOLIC BLOOD PRESSURE: 134 MMHG | DIASTOLIC BLOOD PRESSURE: 72 MMHG | RESPIRATION RATE: 18 BRPM

## 2018-10-23 PROCEDURE — 96366 THER/PROPH/DIAG IV INF ADDON: CPT

## 2018-10-23 PROCEDURE — 74011250636 HC RX REV CODE- 250/636: Performed by: INTERNAL MEDICINE

## 2018-10-23 PROCEDURE — 96365 THER/PROPH/DIAG IV INF INIT: CPT

## 2018-10-23 RX ORDER — MAGNESIUM SULFATE HEPTAHYDRATE 40 MG/ML
2 INJECTION, SOLUTION INTRAVENOUS
Status: COMPLETED | OUTPATIENT
Start: 2018-10-23 | End: 2018-10-23

## 2018-10-23 RX ORDER — SODIUM CHLORIDE 0.9 % (FLUSH) 0.9 %
5-10 SYRINGE (ML) INJECTION AS NEEDED
Status: DISCONTINUED | OUTPATIENT
Start: 2018-10-23 | End: 2018-10-27 | Stop reason: HOSPADM

## 2018-10-23 RX ORDER — SODIUM CHLORIDE 0.9 % (FLUSH) 0.9 %
5-10 SYRINGE (ML) INJECTION AS NEEDED
Status: DISCONTINUED | OUTPATIENT
Start: 2018-10-23 | End: 2018-10-23

## 2018-10-23 RX ADMIN — MAGNESIUM SULFATE HEPTAHYDRATE 2 G: 40 INJECTION, SOLUTION INTRAVENOUS at 12:40

## 2018-10-23 RX ADMIN — MAGNESIUM SULFATE HEPTAHYDRATE 2 G: 40 INJECTION, SOLUTION INTRAVENOUS at 11:22

## 2018-10-23 NOTE — PROGRESS NOTES
[facility] Progress Note Date: 2018 Name: Kade Metz MRN: [id number] : 1945 Mag Infusion 4GM Ms. Peter Roy arrived to [facility] at 1100. Ms. Peter Roy was assessed and education was provided. Visit Vitals /62 (BP 1 Location: Left arm, BP Patient Position: At rest) Pulse 79 Temp 97.4 °F (36.3 °C) Resp 18 SpO2 97%  
 
 
22g IV inserted in patient's left AC x1 attempt. Positive for blood return/ flushes without difficulty. Magnesium Sulfate 4gm IV administered as ordered over 2 hour followed by NS flush. Ms. Peter Roy tolerated well without complaints. IV removed/ intact. Gauze/ coban to site. Ms. Peter Roy was discharged from [place] [address] in stable condition at 1400. She has no future appointments with the [facility] at this time. Ned Joy RN 2018

## 2018-10-23 NOTE — PROGRESS NOTES
Pharmacy Note Name: Jamie Neil : 1945 Estimated body surface area is 1.57 meters squared as calculated from the following: 
  Height as of 18: 154.9 cm (61\"). Weight as of 18: 57.6 kg (127 lb). Diagnosis: Hypomagnesemia Treatment Plan: Magnesium 4  g IVPB Treatment Start Date: 10/23/2018 Lab Results Component Value Date/Time WBC 10.8 2018 05:25 AM  
 PLATELET 878  05:25 AM  
 
Lab Results Component Value Date/Time  
 ABS. NEUTROPHILS 8.3 (H) 2018 05:25 AM  
 
Lab Results Component Value Date/Time Creatinine 0.72 2018 05:25 AM  
 
Most Recent Creatinine Clearance: CrCl: 0.0 mL/min (based on Adjusted Body Weight) Creatinine: 0.72 mg/dL (2018) Pharmacy Note: 
· Called Dr. Vanita Hanna office and spoke to Ankita Dominique · Confirmed that it is ok to give 2 g magnesium sulfate in 50 mL NS IVPB x 2 doses for a total of 100 cc · Pharmacy has premixed bags available for use. Thank you, 
Quillian Felty, PharmD, M.S. 
[facility] [address] [place] [address] Inpatient, Outpatient, and [facility] 53  
[phone] Pharmacist: Franceen Bosworth, [address]

## 2018-11-29 ENCOUNTER — HOSPITAL ENCOUNTER (OUTPATIENT)
Dept: INFUSION THERAPY | Age: 73
Discharge: HOME | End: 2018-11-29
Payer: MEDICARE

## 2018-11-29 VITALS
TEMPERATURE: 97.2 F | SYSTOLIC BLOOD PRESSURE: 126 MMHG | HEART RATE: 81 BPM | OXYGEN SATURATION: 98 % | DIASTOLIC BLOOD PRESSURE: 67 MMHG | RESPIRATION RATE: 18 BRPM

## 2018-11-29 PROCEDURE — 74011250636 HC RX REV CODE- 250/636: Performed by: INTERNAL MEDICINE

## 2018-11-29 PROCEDURE — 96365 THER/PROPH/DIAG IV INF INIT: CPT

## 2018-11-29 RX ORDER — MAGNESIUM SULFATE HEPTAHYDRATE 40 MG/ML
2 INJECTION, SOLUTION INTRAVENOUS ONCE
Status: COMPLETED | OUTPATIENT
Start: 2018-11-29 | End: 2018-11-29

## 2018-11-29 RX ADMIN — MAGNESIUM SULFATE HEPTAHYDRATE 2 G: 40 INJECTION, SOLUTION INTRAVENOUS at 10:23

## 2018-11-29 NOTE — PROGRESS NOTES
Spoke with Dr. Clint Montejo administer magnesium sulfate 2 g in 50 cc sterile water over 1 hour (instead of 2 g magnesium sulfate in 100 mL NS) Pharmacy has 50mL premixed bags available in [facility] station Thank you, 
Sondra Rendon, PharmD, M.S. 
[facility] [address] [place] [address] Inpatient, Outpatient, and [facility] 53  
[phone]

## 2018-11-29 NOTE — PROGRESS NOTES
[facility] Progress Note Date: 2018 Name: Susy Davis MRN: [id number] : 1945 Mag Infusion 2GM Ms. Ben Lui arrived to [facility] at 1005. Ms. Ben Lui was assessed and education was provided. Ms. Romel De Los Santos vitals were reviewed. Visit Vitals /67 (BP 1 Location: Left arm, BP Patient Position: Sitting) Pulse 81 Temp 97.2 °F (36.2 °C) Resp 18 SpO2 98% Breastfeeding? No  
 
 
24g IV inserted in patient's left AC x1 attempt. Positive for blood return/ flushes without difficulty. Magnesium Sulfate 2gm IV administered as ordered over 1 hour followed by NS flush. Ms. Ben Lui tolerated well without complaints. IV removed/ intact. Gauze/ coban to site. Ms. Ben Lui was discharged from [place] [address] in stable condition at 1130. She has no future appointments with the [facility] at this time. Ortiz Cardona RN 2018

## 2019-01-29 ENCOUNTER — HOSPITAL ENCOUNTER (OUTPATIENT)
Dept: INFUSION THERAPY | Age: 74
Discharge: HOME | End: 2019-01-29
Payer: MEDICARE

## 2019-01-29 VITALS
TEMPERATURE: 97.1 F | OXYGEN SATURATION: 98 % | RESPIRATION RATE: 18 BRPM | HEART RATE: 76 BPM | SYSTOLIC BLOOD PRESSURE: 143 MMHG | DIASTOLIC BLOOD PRESSURE: 62 MMHG

## 2019-01-29 PROCEDURE — 96365 THER/PROPH/DIAG IV INF INIT: CPT

## 2019-01-29 PROCEDURE — 74011250636 HC RX REV CODE- 250/636: Performed by: INTERNAL MEDICINE

## 2019-01-29 RX ORDER — SODIUM CHLORIDE 0.9 % (FLUSH) 0.9 %
10-40 SYRINGE (ML) INJECTION AS NEEDED
Status: DISCONTINUED | OUTPATIENT
Start: 2019-01-29 | End: 2019-02-02 | Stop reason: HOSPADM

## 2019-01-29 RX ORDER — MAGNESIUM SULFATE HEPTAHYDRATE 40 MG/ML
2 INJECTION, SOLUTION INTRAVENOUS ONCE
Status: COMPLETED | OUTPATIENT
Start: 2019-01-29 | End: 2019-01-29

## 2019-01-29 RX ADMIN — Medication 10 ML: at 11:05

## 2019-01-29 RX ADMIN — MAGNESIUM SULFATE HEPTAHYDRATE 2 G: 40 INJECTION, SOLUTION INTRAVENOUS at 11:07

## 2019-01-29 NOTE — PROGRESS NOTES
[facility] Progress Note Date: 2019 Name: Pallavi Larry MRN: [id number] : 1945 Magnesium Infusion Ms. Leonard to [facility], ambulatory, at 1050. Pt was assessed and education was provided. Ms. Harish Rasmussen vitals were reviewed and patient was observed for 5 minutes prior to treatment. Visit Vitals /62 (BP 1 Location: Right arm, BP Patient Position: Sitting) Pulse 76 Temp 97.1 °F (36.2 °C) Resp 18 SpO2 98% Breastfeeding? No  
 
 
 
24 g PIV placed in LAC x 1 attempt. PIV flushed easily and had brisk blood return. Magnesium 2g was initiated @ 50 ml/hr over 1 hour. VS stable and pt denied complaints of itching, lip/tongue/facial swelling, SOB, CP or other complaints. Ms. Yvette Rea tolerated the infusion, and had no complaints. VS remained stable. PIV flushed with NS 10 ml and removed. No bleeding or hematoma noted at site. Guaze and coban applied. Reviewed discharge instructions with patient Patient given printed copy to take home. Patient verbalized understanding of discharge instructions. Patient armband removed and shredded. Ms. Yvette Rea was discharged from [place] [address] in stable condition at 1210. She is to follow up with physician as needed. Sajan Merritt RN 
2019

## 2019-03-21 ENCOUNTER — HOSPITAL ENCOUNTER (OUTPATIENT)
Dept: INFUSION THERAPY | Age: 74
Discharge: HOME | End: 2019-03-21
Payer: MEDICARE

## 2019-03-21 VITALS
SYSTOLIC BLOOD PRESSURE: 151 MMHG | RESPIRATION RATE: 18 BRPM | DIASTOLIC BLOOD PRESSURE: 71 MMHG | OXYGEN SATURATION: 97 % | HEART RATE: 84 BPM | TEMPERATURE: 98 F

## 2019-03-21 PROCEDURE — 74011250636 HC RX REV CODE- 250/636: Performed by: INTERNAL MEDICINE

## 2019-03-21 PROCEDURE — 96365 THER/PROPH/DIAG IV INF INIT: CPT

## 2019-03-21 RX ORDER — MAGNESIUM SULFATE HEPTAHYDRATE 40 MG/ML
2 INJECTION, SOLUTION INTRAVENOUS ONCE
Status: COMPLETED | OUTPATIENT
Start: 2019-03-21 | End: 2019-03-21

## 2019-03-21 RX ADMIN — MAGNESIUM SULFATE HEPTAHYDRATE 2 G: 40 INJECTION, SOLUTION INTRAVENOUS at 08:23

## 2019-03-21 NOTE — PROGRESS NOTES
[facility] Progress Note Date: 2019 Name: Hernan Nielsen MRN: [id number] : 1945 Magnesium Infusion Ms. Jan Valles to [facility], ambulatory, at 36. Pt was assessed and education was provided. Ms. Pastor Vergara vitals were reviewed and patient was observed for 5 minutes prior to treatment. Visit Vitals /71 (BP 1 Location: Left arm, BP Patient Position: Sitting) Pulse 84 Temp 98 °F (36.7 °C) Resp 18 SpO2 97% Breastfeeding? No  
 
 
 
24 g PIV placed in LAC x 1 attempt. PIV flushed easily and had brisk blood return. Magnesium 2g was initiated @ 50 ml/hr over 1 hour. VS stable and pt denied complaints of itching, lip/tongue/facial swelling, SOB, CP or other complaints. Ms. Jan Valles tolerated the infusion, and had no complaints. VS remained stable. PIV flushed with NS 10 ml and removed. No bleeding or hematoma noted at site. Guaze and coban applied. Reviewed discharge instructions with patient Patient given printed copy to take home. Patient verbalized understanding of discharge instructions. Patient armband removed and shredded. Ms. Jan Valles was discharged from [place] [address] in stable condition at 0930. She is to follow up with physician as needed. Jyoti Brewster RN 
2019

## 2019-04-25 RX ORDER — MAGNESIUM SULFATE HEPTAHYDRATE 40 MG/ML
2 INJECTION, SOLUTION INTRAVENOUS
Status: ACTIVE | OUTPATIENT
Start: 2019-04-25 | End: 2019-04-25

## 2019-04-26 ENCOUNTER — HOSPITAL ENCOUNTER (OUTPATIENT)
Dept: INFUSION THERAPY | Age: 74
Discharge: HOME | End: 2019-04-26
Payer: MEDICARE

## 2019-04-26 VITALS
HEIGHT: 61 IN | DIASTOLIC BLOOD PRESSURE: 70 MMHG | BODY MASS INDEX: 26.43 KG/M2 | RESPIRATION RATE: 18 BRPM | SYSTOLIC BLOOD PRESSURE: 134 MMHG | HEART RATE: 74 BPM | OXYGEN SATURATION: 96 % | WEIGHT: 140 LBS | TEMPERATURE: 98 F

## 2019-04-26 PROCEDURE — 74011250636 HC RX REV CODE- 250/636: Performed by: INTERNAL MEDICINE

## 2019-04-26 PROCEDURE — 96366 THER/PROPH/DIAG IV INF ADDON: CPT

## 2019-04-26 PROCEDURE — 96365 THER/PROPH/DIAG IV INF INIT: CPT

## 2019-04-26 RX ORDER — SODIUM CHLORIDE 0.9 % (FLUSH) 0.9 %
10-40 SYRINGE (ML) INJECTION AS NEEDED
Status: DISCONTINUED | OUTPATIENT
Start: 2019-04-26 | End: 2019-04-29 | Stop reason: HOSPADM

## 2019-04-26 RX ORDER — MAGNESIUM SULFATE HEPTAHYDRATE 40 MG/ML
2 INJECTION, SOLUTION INTRAVENOUS
Status: COMPLETED | OUTPATIENT
Start: 2019-04-26 | End: 2019-04-26

## 2019-04-26 RX ORDER — SODIUM CHLORIDE 9 MG/ML
50 INJECTION, SOLUTION INTRAVENOUS CONTINUOUS
Status: DISCONTINUED | OUTPATIENT
Start: 2019-04-26 | End: 2019-04-27 | Stop reason: HOSPADM

## 2019-04-26 RX ADMIN — Medication 10 ML: at 13:40

## 2019-04-26 RX ADMIN — Medication 10 ML: at 08:25

## 2019-04-26 RX ADMIN — MAGNESIUM SULFATE HEPTAHYDRATE 2 G: 40 INJECTION, SOLUTION INTRAVENOUS at 10:39

## 2019-04-26 RX ADMIN — SODIUM CHLORIDE 50 ML/HR: 9 INJECTION, SOLUTION INTRAVENOUS at 08:29

## 2019-04-26 RX ADMIN — MAGNESIUM SULFATE HEPTAHYDRATE 2 G: 40 INJECTION, SOLUTION INTRAVENOUS at 08:30

## 2019-04-26 NOTE — PROGRESS NOTES
[facility] Progress Note Date: 2019 Name: Radha Amador MRN: [id number] : 1945 Magnesium Infusion Ms. Leonard to [place], [facility], at 56. Pt was assessed and education was provided. Ms. Yovani Luna vitals were reviewed and patient was observed for 5 minutes prior to treatment. Visit Vitals /70 (BP 1 Location: Left arm, BP Patient Position: Sitting) Pulse 74 Temp 98 °F (36.7 °C) Resp 18 Ht 5' 1\" (1.549 m) Wt 63.5 kg (140 lb) SpO2 96% Breastfeeding? No  
BMI 26.45 kg/m² 24 g PIV placed in LAC x 1 attempt. PIV flushed easily and had brisk blood return. Magnesium 4g was initiated @ 25 ml/hr over 4 hour. VS stable and pt denied complaints of itching, lip/tongue/facial swelling, SOB, CP or other complaints. Ms. Antony Tyson tolerated the infusion, and had no complaints. VS remained stable. PIV flushed with NS 10 ml and removed. No bleeding or hematoma noted at site. Guaze and coban applied. Patient armband removed and shredded. Ms. Antony Tyson was discharged from [place] [address] in stable condition at [phone]. She is to follow up with physician as needed. Colin Rivas RN 2019

## 2019-05-30 ENCOUNTER — HOSPITAL ENCOUNTER (OUTPATIENT)
Dept: INFUSION THERAPY | Age: 74
Discharge: HOME | End: 2019-05-30
Payer: MEDICARE

## 2019-05-30 VITALS
OXYGEN SATURATION: 98 % | TEMPERATURE: 97.3 F | DIASTOLIC BLOOD PRESSURE: 68 MMHG | HEART RATE: 63 BPM | SYSTOLIC BLOOD PRESSURE: 147 MMHG | RESPIRATION RATE: 18 BRPM

## 2019-05-30 PROCEDURE — 96366 THER/PROPH/DIAG IV INF ADDON: CPT

## 2019-05-30 PROCEDURE — 96365 THER/PROPH/DIAG IV INF INIT: CPT

## 2019-05-30 PROCEDURE — 74011250636 HC RX REV CODE- 250/636: Performed by: INTERNAL MEDICINE

## 2019-05-30 RX ORDER — MAGNESIUM SULFATE HEPTAHYDRATE 40 MG/ML
INJECTION, SOLUTION INTRAVENOUS
Status: DISPENSED
Start: 2019-05-30 | End: 2019-05-30

## 2019-05-30 RX ORDER — MAGNESIUM SULFATE HEPTAHYDRATE 40 MG/ML
2 INJECTION, SOLUTION INTRAVENOUS
Status: COMPLETED | OUTPATIENT
Start: 2019-05-30 | End: 2019-05-30

## 2019-05-30 RX ORDER — CLONIDINE HYDROCHLORIDE 0.1 MG/1
0.1 TABLET ORAL 2 TIMES DAILY
COMMUNITY

## 2019-05-30 RX ADMIN — MAGNESIUM SULFATE HEPTAHYDRATE 2 G: 40 INJECTION, SOLUTION INTRAVENOUS at 11:00

## 2019-05-30 RX ADMIN — MAGNESIUM SULFATE HEPTAHYDRATE 2 G: 40 INJECTION, SOLUTION INTRAVENOUS at 08:25

## 2019-05-30 NOTE — PROGRESS NOTES
[facility] Progress Note    Date: May 30, 2019    Name: Ranulfo Chance    MRN: [id number]         : 1945    Magnesium    Ms. Radha Ya was assessed and education was provided. Ms. Justin Rubin vitals were reviewed and patient was observed for 5 minutes prior to treatment. Visit Vitals  /68 (BP 1 Location: Right arm, BP Patient Position: Sitting)   Pulse 63   Temp 97.3 °F (36.3 °C)   Resp 18   SpO2 98%       Lab results were obtained and reviewed. No results found for this or any previous visit (from the past 12 hour(s)). Saline lock started to Left arm x1 attempt using 24g catheter, line flushes briskly. Magnesium 4gm was infused over 4 hours. Magnesium 2gm /50 ml x 2 doses given at 25ml/hr. After infusion complete line flushed with normal saline then removed. Gauze and coban applied to site. Ms. Radha Ya tolerated the infusion, and had no complaints. Patient armband removed and shredded. Ms. Radha Ya was discharged from [place] [address] in stable condition at 1325. She is to follow up with physician for her next appointment.     Maryam Weaver RN  May 30, 2019  4:16 PM

## 2019-06-24 NOTE — PROGRESS NOTES
Pharmacy Note     Name: Cole Post  : 1945  Estimated body surface area is 1.65 meters squared as calculated from the following:    Height as of 19: 154.9 cm (61\"). Weight as of 19: 63.5 kg (140 lb). Treatment Plan: Magnesium      Lab Results   Component Value Date/Time    WBC 10.8 2018 05:25 AM    PLATELET 065  05:25 AM     Lab Results   Component Value Date/Time    ABS.  NEUTROPHILS 8.3 (H) 2018 05:25 AM     Lab Results   Component Value Date/Time    Creatinine 0.72 2018 05:25 AM     Most Recent Creatinine Clearance:  CrCl: 0.0 mL/min (based on Adjusted Body Weight)  Creatinine: 0.72 mg/dL (2018)      Pharmacy Intervention:  · Originally Spoke with Ankita Dominique on behalf of Dr. Anabela Abebe   · Clarified Rx- magnesium 2 g IVPB over 2 hours x 2 days  · Original Total dose: magnesium 4 g IVPB given over a 2 day course  · Patient arrived to clinic and requested that all 4 g of Magnesium be administered in one day  · Contacted Dr. Zainab Sherman office again to verify that all 4 g of magnesium sulfate be given today   · Ok to proceed with magnesium sulfate 4 g over 4 hours     Thank you,  Dean Gooden, PharmD, M.S.  [facility], Outpatient, and [facility] 53   [phone]    Pharmacist: Rockie Angelucci, [facility]

## 2019-06-25 ENCOUNTER — HOSPITAL ENCOUNTER (OUTPATIENT)
Dept: INFUSION THERAPY | Age: 74
Discharge: HOME | End: 2019-06-25
Payer: MEDICARE

## 2019-06-25 VITALS
BODY MASS INDEX: 26.81 KG/M2 | HEART RATE: 61 BPM | OXYGEN SATURATION: 100 % | WEIGHT: 142 LBS | HEIGHT: 61 IN | TEMPERATURE: 97.4 F | DIASTOLIC BLOOD PRESSURE: 61 MMHG | SYSTOLIC BLOOD PRESSURE: 147 MMHG | RESPIRATION RATE: 17 BRPM

## 2019-06-25 PROCEDURE — 96365 THER/PROPH/DIAG IV INF INIT: CPT

## 2019-06-25 PROCEDURE — 96366 THER/PROPH/DIAG IV INF ADDON: CPT

## 2019-06-25 PROCEDURE — 74011250636 HC RX REV CODE- 250/636: Performed by: INTERNAL MEDICINE

## 2019-06-25 RX ORDER — MAGNESIUM SULFATE HEPTAHYDRATE 40 MG/ML
2 INJECTION, SOLUTION INTRAVENOUS ONCE
Status: COMPLETED | OUTPATIENT
Start: 2019-06-25 | End: 2019-06-25

## 2019-06-25 RX ADMIN — MAGNESIUM SULFATE HEPTAHYDRATE 2 G: 40 INJECTION, SOLUTION INTRAVENOUS at 10:30

## 2019-06-25 RX ADMIN — MAGNESIUM SULFATE HEPTAHYDRATE 2 G: 40 INJECTION, SOLUTION INTRAVENOUS at 08:41

## 2019-06-25 NOTE — PROGRESS NOTES
[facility] Progress Note    Date: 2019    Name: Umu Zaragoza    MRN: [id number]         : 1945    Magnesium    Ms. Dinesh Tapia was assessed and education was provided. Ms. Ramakrishna Pinto vitals were reviewed and patient was observed for 5 minutes prior to treatment. Visit Vitals  /61 (BP 1 Location: Right arm, BP Patient Position: Sitting)   Pulse 61   Temp 97.4 °F (36.3 °C)   Resp 17   Ht 5' 1\" (1.549 m)   Wt 64.4 kg (142 lb)   SpO2 100%   Breastfeeding? No   BMI 26.83 kg/m²       Patient Vitals for the past 12 hrs:   Temp Pulse Resp BP SpO2   19 0936    147/61    19 0823    199/73    19 0820 97.4 °F (36.3 °C) 61 17 187/70 100 %       Saline lock started to Left antecubital x1 attempt using 22g catheter, line flushes briskly. Magnesium 4gm was infused over 4 hours. Magnesium 2gm /50 ml x 2 doses given at 25ml/hr. After infusion complete line flushed with normal saline then removed. Gauze and coban applied to site. Ms. Dinesh Tapia tolerated the infusion, and had no complaints. Patient armband removed and shredded. Ms. Dinesh Tapia was discharged from [place] [address] in stable condition at 1225. She is to follow up with physician for her next appointment.     Roselyn Butterfield RN  2019  1225

## 2019-07-01 ENCOUNTER — HOSPITAL ENCOUNTER (OUTPATIENT)
Dept: NON INVASIVE DIAGNOSTICS | Age: 74
Discharge: HOME | End: 2019-07-01
Attending: INTERNAL MEDICINE
Payer: MEDICARE

## 2019-07-01 DIAGNOSIS — R00.2 PALPITATIONS: ICD-10-CM

## 2019-07-01 PROCEDURE — 93226 XTRNL ECG REC<48 HR SCAN A/R: CPT

## 2019-07-09 ENCOUNTER — APPOINTMENT (OUTPATIENT)
Dept: INFUSION THERAPY | Age: 74
End: 2019-07-09

## 2019-07-19 ENCOUNTER — HOSPITAL ENCOUNTER (OUTPATIENT)
Dept: VASCULAR SURGERY | Age: 74
Discharge: HOME | End: 2019-07-19
Attending: INTERNAL MEDICINE
Payer: MEDICARE

## 2019-07-19 DIAGNOSIS — I10 HTN (HYPERTENSION): ICD-10-CM

## 2019-07-19 LAB
ABDOMINAL PROX AORTA VEL: 76 CM/S
LEFT KIDNEY ARCUATE ARTERY MEDIAL EDV: 7.9 CM/S
LEFT KIDNEY ARCUATE ARTERY MEDIAL PSV: 31.1 CM/S
LEFT KIDNEY LENGTH: 10 CM
LEFT KIDNEY MED ARCUATE RI: 0.75
LEFT KIDNEY WIDTH: 3.9 CM
LEFT RENAL DIST DIAS: 11.5 CM/S
LEFT RENAL DIST RAR: 0.74
LEFT RENAL DIST RI: 0.8
LEFT RENAL DIST SYS: 56.6 CM/S
LEFT RENAL LOWER PARENCHYMA MAX: 42.2 CM/S
LEFT RENAL LOWER PARENCHYMA MIN: 10.5 CM/S
LEFT RENAL LOWER PARENCHYMA RI: 0.75
LEFT RENAL MID DIAS: 15.6 CM/S
LEFT RENAL MID POLE ACCEL TIME: 0.03 S
LEFT RENAL MID RAR: 0.81
LEFT RENAL MID RI: 0.75
LEFT RENAL MID SYS: 61.5 CM/S
LEFT RENAL ORIGIN DIAS: 16.3 CM/S
LEFT RENAL ORIGIN RAR: 1.25
LEFT RENAL ORIGIN RI: 0.83
LEFT RENAL ORIGIN SYS: 95.3 CM/S
LEFT RENAL PROX DIAS: 19.6 CM/S
LEFT RENAL PROX RAR: 1.17
LEFT RENAL PROX RI: 0.78
LEFT RENAL PROX SYS: 88.6 CM/S
RIGHT KIDNEY ARCUATE ARTERY MEDIAL EDV: 8.7 CM/S
RIGHT KIDNEY ARCUATE ARTERY MEDIAL PSV: 36.1 CM/S
RIGHT KIDNEY LENGTH: 9.9 CM
RIGHT KIDNEY MED ARCUATE RI: 0.76
RIGHT KIDNEY WIDTH: 4.5 CM
RIGHT RENAL DIST DIAS: 14.1 CM/S
RIGHT RENAL DIST RAR: 0.84
RIGHT RENAL DIST RI: 0.78
RIGHT RENAL DIST SYS: 63.8 CM/S
RIGHT RENAL MID DIAS: 15.4 CM/S
RIGHT RENAL MID POLE ACCEL TIME: 0.02 S
RIGHT RENAL MID RAR: 0.9
RIGHT RENAL MID RI: 0.77
RIGHT RENAL MID SYS: 68.2 CM/S
RIGHT RENAL MIDDLE PARENCHYMA MAX: 55.3 CM/S
RIGHT RENAL MIDDLE PARENCHYMA MIN: 12 CM/S
RIGHT RENAL MIDDLE PARENCHYMA RI: 0.78
RIGHT RENAL ORIGIN DIAS: 29.9 CM/S
RIGHT RENAL ORIGIN RAR: 1.59
RIGHT RENAL ORIGIN RI: 0.75
RIGHT RENAL ORIGIN SYS: 121 CM/S
RIGHT RENAL PROX DIAS: 24.5 CM/S
RIGHT RENAL PROX RAR: 1.5
RIGHT RENAL PROX RI: 0.79
RIGHT RENAL PROX SYS: 114.2 CM/S

## 2019-07-19 PROCEDURE — 93975 VASCULAR STUDY: CPT

## 2019-09-09 ENCOUNTER — HOSPITAL ENCOUNTER (OUTPATIENT)
Dept: INFUSION THERAPY | Age: 74
Discharge: HOME | End: 2019-09-09
Payer: MEDICARE

## 2019-09-09 VITALS
SYSTOLIC BLOOD PRESSURE: 179 MMHG | WEIGHT: 143 LBS | HEART RATE: 52 BPM | HEIGHT: 61 IN | DIASTOLIC BLOOD PRESSURE: 69 MMHG | TEMPERATURE: 97.3 F | OXYGEN SATURATION: 98 % | BODY MASS INDEX: 27 KG/M2

## 2019-09-09 PROCEDURE — 96365 THER/PROPH/DIAG IV INF INIT: CPT

## 2019-09-09 PROCEDURE — 96366 THER/PROPH/DIAG IV INF ADDON: CPT

## 2019-09-09 PROCEDURE — 74011250636 HC RX REV CODE- 250/636: Performed by: INTERNAL MEDICINE

## 2019-09-09 RX ORDER — PRAZOSIN HYDROCHLORIDE 2 MG/1
2 CAPSULE ORAL 2 TIMES DAILY
COMMUNITY

## 2019-09-09 RX ORDER — OLMESARTAN MEDOXOMIL AND HYDROCHLOROTHIAZIDE 40/25 40; 25 MG/1; MG/1
1 TABLET ORAL DAILY
COMMUNITY

## 2019-09-09 RX ORDER — MAGNESIUM SULFATE HEPTAHYDRATE 40 MG/ML
2 INJECTION, SOLUTION INTRAVENOUS
Status: COMPLETED | OUTPATIENT
Start: 2019-09-09 | End: 2019-09-09

## 2019-09-09 RX ORDER — METOPROLOL TARTRATE 25 MG/1
25 TABLET, FILM COATED ORAL 2 TIMES DAILY
COMMUNITY

## 2019-09-09 RX ADMIN — MAGNESIUM SULFATE HEPTAHYDRATE 2 G: 40 INJECTION, SOLUTION INTRAVENOUS at 09:30

## 2019-09-09 RX ADMIN — MAGNESIUM SULFATE HEPTAHYDRATE 2 G: 40 INJECTION, SOLUTION INTRAVENOUS at 10:31

## 2019-09-09 NOTE — PROGRESS NOTES
[facility] Progress Note    Date: 2019    Name: Mayte Fam    MRN: [id number]         : 1945    Magnesium    Ms. Darryl Ramos was assessed and education was provided. Ms. Anastasia Jackson vitals were reviewed and patient was observed for 5 minutes prior to treatment. Visit Vitals  /69 (BP 1 Location: Right arm, BP Patient Position: Sitting)   Pulse (!) 52   Temp 97.3 °F (36.3 °C)   Ht 5' 1\" (1.549 m)   Wt 64.9 kg (143 lb)   SpO2 98%   BMI 27.02 kg/m²       Patient Vitals for the past 12 hrs:   Temp Pulse BP SpO2   19 0905 97.3 °F (36.3 °C) (!) 52 179/69 98 %       Saline lock started to Left antecubital x1 attempt using 22g catheter, line flushes briskly. Magnesium 4gm was infused over 4 hours. Magnesium 2gm /50 ml x 2 doses given at 25ml/hr. After infusion complete line flushed with normal saline then removed. Gauze and coban applied to site. Ms. Darryl Ramos tolerated the infusion, and had no complaints. Patient armband removed and shredded. Ms. Darryl Ramos was discharged from [place] [address] in stable condition at 1230. She is to follow up with physician for her next appointment.     Carmen Gordon RN  2019  1230

## 2019-10-07 ENCOUNTER — HOSPITAL ENCOUNTER (OUTPATIENT)
Dept: INFUSION THERAPY | Age: 74
Discharge: HOME | End: 2019-10-07
Payer: MEDICARE

## 2019-10-07 VITALS
HEART RATE: 48 BPM | DIASTOLIC BLOOD PRESSURE: 61 MMHG | TEMPERATURE: 97.8 F | SYSTOLIC BLOOD PRESSURE: 153 MMHG | RESPIRATION RATE: 16 BRPM | OXYGEN SATURATION: 96 %

## 2019-10-07 PROCEDURE — 74011250636 HC RX REV CODE- 250/636: Performed by: INTERNAL MEDICINE

## 2019-10-07 PROCEDURE — 96366 THER/PROPH/DIAG IV INF ADDON: CPT

## 2019-10-07 PROCEDURE — 96365 THER/PROPH/DIAG IV INF INIT: CPT

## 2019-10-07 PROCEDURE — 96374 THER/PROPH/DIAG INJ IV PUSH: CPT

## 2019-10-07 RX ORDER — MAGNESIUM SULFATE HEPTAHYDRATE 40 MG/ML
2 INJECTION, SOLUTION INTRAVENOUS ONCE
Status: COMPLETED | OUTPATIENT
Start: 2019-10-07 | End: 2019-10-07

## 2019-10-07 RX ORDER — SODIUM CHLORIDE 0.9 % (FLUSH) 0.9 %
5-10 SYRINGE (ML) INJECTION AS NEEDED
Status: DISCONTINUED | OUTPATIENT
Start: 2019-10-07 | End: 2019-10-11 | Stop reason: HOSPADM

## 2019-10-07 RX ORDER — SODIUM CHLORIDE 9 MG/ML
100 INJECTION, SOLUTION INTRAVENOUS CONTINUOUS
Status: DISCONTINUED | OUTPATIENT
Start: 2019-10-07 | End: 2019-10-08 | Stop reason: HOSPADM

## 2019-10-07 RX ADMIN — MAGNESIUM SULFATE HEPTAHYDRATE 2 G: 40 INJECTION, SOLUTION INTRAVENOUS at 10:13

## 2019-10-07 RX ADMIN — Medication 10 ML: at 08:20

## 2019-10-07 RX ADMIN — SODIUM CHLORIDE 100 ML/HR: 9 INJECTION, SOLUTION INTRAVENOUS at 08:30

## 2019-10-07 RX ADMIN — MAGNESIUM SULFATE HEPTAHYDRATE 2 G: 40 INJECTION, SOLUTION INTRAVENOUS at 08:30

## 2019-10-07 NOTE — PROGRESS NOTES
Pharmacy Note     Name: Krista Davila  : 1945  Estimated body surface area is 1.67 meters squared as calculated from the following:    Height as of 19: 154.9 cm (61\"). Weight as of 19: 64.9 kg (143 lb). Treatment Plan: Magnesium Sulfate  Treatment Date: 10/7/2019    Lab Results   Component Value Date/Time    WBC 10.8 2018 05:25 AM    PLATELET 934  05:25 AM     Lab Results   Component Value Date/Time    ABS.  NEUTROPHILS 8.3 (H) 2018 05:25 AM     Lab Results   Component Value Date/Time    Creatinine 0.72 2018 05:25 AM     Most Recent Creatinine Clearance:  CrCl: 0.0 mL/min (based on Adjusted Body Weight)  Creatinine: 0.72 mg/dL (2018)      Pharmacy Intervention:  Per [facility], patient would like to receive her full dose of 4 g of magnesium sulfate today on 10/7/2019- she does not want to come back for a 2nd appointment  Saw and spoke with Dr. Teresa Ramirez and he is agreeable to giving all 4 g of magnesium sulfate IVPB today- run over 4 hours  Continue to monitor    Thank you,  Molly Alford, PharmD, M.S.  [address] [place], Outpatient, and [place] [address]   [phone]     Pharmacist: JOSELYN Rodas

## 2019-10-07 NOTE — PROGRESS NOTES
[facility] Progress Note    Date: 2019    Name: Abron Skiff    MRN: [id number]         : 1945    Magnesium    Ms. Claudia Oh was assessed and education was provided. Ms. Cynthia Oliva vitals were reviewed and patient was observed for 5 minutes prior to treatment. Patient Vitals for the past 12 hrs:   Temp Pulse Resp BP SpO2   10/07/19 1245 97.8 °F (36.6 °C) (!) 48 16 153/61 96 %   10/07/19 1013 97.3 °F (36.3 °C) (!) 50 16 136/51 95 %   10/07/19 0810 97.2 °F (36.2 °C) (!) 50 16 143/60 98 %       Saline lock started to Left antecubital x1 attempt using 22g catheter, line flushes briskly. Magnesium 4gm was infused over 4 hours. Magnesium 2gm /50 ml x 2 doses given at 25ml/hr. After infusion complete line flushed with normal saline then removed. Gauze and coban applied to site. Ms. Claudia Oh tolerated the infusion, and had no complaints. Patient armband removed and shredded. Ms. Claudia Oh was discharged from [place] [address] in stable condition at [phone]. She is to follow up with physician for her next appointment.     Claribel García RN  2019

## 2019-10-30 RX ORDER — MAGNESIUM SULFATE HEPTAHYDRATE 40 MG/ML
2 INJECTION, SOLUTION INTRAVENOUS ONCE
Status: DISCONTINUED | OUTPATIENT
Start: 2019-10-31 | End: 2019-10-30

## 2019-10-31 ENCOUNTER — HOSPITAL ENCOUNTER (OUTPATIENT)
Dept: INFUSION THERAPY | Age: 74
Discharge: HOME | End: 2019-10-31
Payer: MEDICARE

## 2019-10-31 VITALS
OXYGEN SATURATION: 98 % | TEMPERATURE: 97.2 F | HEART RATE: 51 BPM | DIASTOLIC BLOOD PRESSURE: 53 MMHG | RESPIRATION RATE: 17 BRPM | SYSTOLIC BLOOD PRESSURE: 120 MMHG

## 2019-10-31 PROCEDURE — 96366 THER/PROPH/DIAG IV INF ADDON: CPT

## 2019-10-31 PROCEDURE — 74011250636 HC RX REV CODE- 250/636: Performed by: INTERNAL MEDICINE

## 2019-10-31 PROCEDURE — 96365 THER/PROPH/DIAG IV INF INIT: CPT

## 2019-10-31 RX ORDER — MAGNESIUM SULFATE HEPTAHYDRATE 40 MG/ML
2 INJECTION, SOLUTION INTRAVENOUS ONCE
Status: COMPLETED | OUTPATIENT
Start: 2019-10-31 | End: 2019-10-31

## 2019-10-31 RX ADMIN — MAGNESIUM SULFATE HEPTAHYDRATE 2 G: 40 INJECTION, SOLUTION INTRAVENOUS at 08:37

## 2019-11-01 ENCOUNTER — APPOINTMENT (OUTPATIENT)
Dept: INFUSION THERAPY | Age: 74
End: 2019-11-01
Payer: MEDICARE

## 2019-12-03 ENCOUNTER — HOSPITAL ENCOUNTER (OUTPATIENT)
Dept: INFUSION THERAPY | Age: 74
Discharge: HOME | End: 2019-12-03
Payer: MEDICARE

## 2019-12-03 VITALS
SYSTOLIC BLOOD PRESSURE: 140 MMHG | OXYGEN SATURATION: 100 % | RESPIRATION RATE: 18 BRPM | HEART RATE: 53 BPM | TEMPERATURE: 97 F | DIASTOLIC BLOOD PRESSURE: 64 MMHG

## 2019-12-03 PROCEDURE — 96366 THER/PROPH/DIAG IV INF ADDON: CPT

## 2019-12-03 PROCEDURE — 96365 THER/PROPH/DIAG IV INF INIT: CPT

## 2019-12-03 PROCEDURE — 74011250636 HC RX REV CODE- 250/636: Performed by: INTERNAL MEDICINE

## 2019-12-03 RX ORDER — MAGNESIUM SULFATE HEPTAHYDRATE 40 MG/ML
2 INJECTION, SOLUTION INTRAVENOUS
Status: COMPLETED | OUTPATIENT
Start: 2019-12-03 | End: 2019-12-03

## 2019-12-03 RX ADMIN — MAGNESIUM SULFATE HEPTAHYDRATE 2 G: 40 INJECTION, SOLUTION INTRAVENOUS at 13:38

## 2019-12-03 RX ADMIN — MAGNESIUM SULFATE HEPTAHYDRATE 2 G: 40 INJECTION, SOLUTION INTRAVENOUS at 11:42

## 2019-12-03 NOTE — PROGRESS NOTES
[facility] Progress Note    Date: December 3, 2019    Name: Hernan Nielsen    MRN: [id number]         : 1945    Magnesium    Ms. Jan Valles was assessed and education was provided. Ms. Pastor Vergara vitals were reviewed and patient was observed for 5 minutes prior to treatment. Visit Vitals  /64 (BP 1 Location: Right arm, BP Patient Position: Sitting)   Pulse (!) 53   Temp 97 °F (36.1 °C)   Resp 18   SpO2 100%       Lab results were obtained and reviewed. No results found for this or any previous visit (from the past 12 hour(s)). Saline lock started to Left arm x1 attempt using 24g catheter, line flushes briskly. Magnesium 4gm was infused over 4 hours. Magnesium 2gm /50 ml x 2 doses given at 25ml/hr. After infusion complete line flushed with normal saline then removed. Gauze and coban applied to site. Ms. Jan Valles tolerated the infusion, and had no complaints. Patient armband removed and shredded. Ms. Jan Valles was discharged from [place] [address] in stable condition at 1600. She is to follow up with physician for her next appointment.     Marco Antonio Jamison RN  December 3, 2019

## 2019-12-30 ENCOUNTER — HOSPITAL ENCOUNTER (OUTPATIENT)
Dept: INFUSION THERAPY | Age: 74
Discharge: HOME | End: 2019-12-30
Payer: MEDICARE

## 2019-12-30 VITALS
TEMPERATURE: 97.1 F | HEART RATE: 58 BPM | OXYGEN SATURATION: 100 % | SYSTOLIC BLOOD PRESSURE: 148 MMHG | DIASTOLIC BLOOD PRESSURE: 63 MMHG | RESPIRATION RATE: 18 BRPM

## 2019-12-30 PROCEDURE — 74011250636 HC RX REV CODE- 250/636: Performed by: INTERNAL MEDICINE

## 2019-12-30 PROCEDURE — 96365 THER/PROPH/DIAG IV INF INIT: CPT

## 2019-12-30 PROCEDURE — 96366 THER/PROPH/DIAG IV INF ADDON: CPT

## 2019-12-30 RX ORDER — MAGNESIUM SULFATE HEPTAHYDRATE 40 MG/ML
2 INJECTION, SOLUTION INTRAVENOUS
Status: COMPLETED | OUTPATIENT
Start: 2019-12-30 | End: 2019-12-30

## 2019-12-30 RX ADMIN — MAGNESIUM SULFATE HEPTAHYDRATE 2 G: 40 INJECTION, SOLUTION INTRAVENOUS at 11:24

## 2019-12-30 RX ADMIN — MAGNESIUM SULFATE HEPTAHYDRATE 2 G: 40 INJECTION, SOLUTION INTRAVENOUS at 13:28

## 2020-01-06 ENCOUNTER — APPOINTMENT (OUTPATIENT)
Dept: INFUSION THERAPY | Age: 75
End: 2020-01-06

## 2020-01-21 ENCOUNTER — HOSPITAL ENCOUNTER (OUTPATIENT)
Dept: GENERAL RADIOLOGY | Age: 75
Discharge: HOME | End: 2020-01-21
Attending: INTERNAL MEDICINE
Payer: MEDICARE

## 2020-01-21 DIAGNOSIS — R05.9 COUGH: ICD-10-CM

## 2020-01-21 PROCEDURE — 71046 X-RAY EXAM CHEST 2 VIEWS: CPT

## 2020-01-23 ENCOUNTER — HOSPITAL ENCOUNTER (OUTPATIENT)
Dept: INFUSION THERAPY | Age: 75
Discharge: HOME | End: 2020-01-23
Payer: MEDICARE

## 2020-01-23 VITALS
DIASTOLIC BLOOD PRESSURE: 64 MMHG | SYSTOLIC BLOOD PRESSURE: 136 MMHG | RESPIRATION RATE: 18 BRPM | HEART RATE: 53 BPM | TEMPERATURE: 97 F

## 2020-01-23 PROCEDURE — 74011250636 HC RX REV CODE- 250/636: Performed by: INTERNAL MEDICINE

## 2020-01-23 PROCEDURE — 96366 THER/PROPH/DIAG IV INF ADDON: CPT

## 2020-01-23 PROCEDURE — 96365 THER/PROPH/DIAG IV INF INIT: CPT

## 2020-01-23 RX ORDER — MAGNESIUM SULFATE HEPTAHYDRATE 40 MG/ML
2 INJECTION, SOLUTION INTRAVENOUS
Status: COMPLETED | OUTPATIENT
Start: 2020-01-23 | End: 2020-01-23

## 2020-01-23 RX ORDER — AMLODIPINE BESYLATE 2.5 MG/1
2.5 TABLET ORAL DAILY
COMMUNITY

## 2020-01-23 RX ADMIN — MAGNESIUM SULFATE HEPTAHYDRATE 2 G: 40 INJECTION, SOLUTION INTRAVENOUS at 10:36

## 2020-01-23 RX ADMIN — MAGNESIUM SULFATE HEPTAHYDRATE 2 G: 40 INJECTION, SOLUTION INTRAVENOUS at 08:41

## 2020-01-23 NOTE — PROGRESS NOTES
[facility] Progress Note    Date: 2020    Name: Celina Jaeger    MRN: [id number]         : 1945    Magnesium    Ms. Twyla Marc was assessed and education was provided. Ms. Nina Bauer vitals were reviewed and patient was observed for 5 minutes prior to treatment. Visit Vitals  /64 (BP 1 Location: Left arm, BP Patient Position: Sitting)   Pulse (!) 53   Temp 97 °F (36.1 °C)   Resp 18       Lab results were obtained and reviewed. No results found for this or any previous visit (from the past 12 hour(s)). Saline lock started to Left arm x1 attempt using 24g catheter, line flushes briskly. Magnesium 4gm was infused over 4 hours. Magnesium 2gm /50 ml x 2 doses given at 25ml/hr. After infusion complete line flushed with normal saline then removed. Gauze and coban applied to site. Ms. Twyla Marc tolerated the infusion, and had no complaints. Patient armband removed and shredded. Ms. Twyla Marc was discharged from [place] [address] in stable condition at [phone]. She is to follow up with physician for her next appointment.     Elena Vickers RN  2020

## 2020-02-03 ENCOUNTER — HOSPITAL ENCOUNTER (OUTPATIENT)
Dept: ULTRASOUND IMAGING | Age: 75
Discharge: HOME | End: 2020-02-03
Attending: INTERNAL MEDICINE
Payer: MEDICARE

## 2020-02-03 DIAGNOSIS — E07.9 DISEASE OF THYROID GLAND: ICD-10-CM

## 2020-02-03 PROCEDURE — 76536 US EXAM OF HEAD AND NECK: CPT

## 2020-02-17 ENCOUNTER — HOSPITAL ENCOUNTER (OUTPATIENT)
Dept: INFUSION THERAPY | Age: 75
Discharge: HOME | End: 2020-02-17
Payer: MEDICARE

## 2020-02-17 VITALS
SYSTOLIC BLOOD PRESSURE: 127 MMHG | OXYGEN SATURATION: 97 % | DIASTOLIC BLOOD PRESSURE: 53 MMHG | HEART RATE: 61 BPM | RESPIRATION RATE: 18 BRPM | TEMPERATURE: 97.3 F

## 2020-02-17 PROCEDURE — 74011250636 HC RX REV CODE- 250/636: Performed by: INTERNAL MEDICINE

## 2020-02-17 PROCEDURE — 96365 THER/PROPH/DIAG IV INF INIT: CPT

## 2020-02-17 PROCEDURE — 96366 THER/PROPH/DIAG IV INF ADDON: CPT

## 2020-02-17 RX ORDER — MAGNESIUM SULFATE HEPTAHYDRATE 40 MG/ML
2 INJECTION, SOLUTION INTRAVENOUS ONCE
Status: COMPLETED | OUTPATIENT
Start: 2020-02-17 | End: 2020-02-17

## 2020-02-17 RX ADMIN — MAGNESIUM SULFATE HEPTAHYDRATE 2 G: 40 INJECTION, SOLUTION INTRAVENOUS at 10:19

## 2020-03-06 ENCOUNTER — HOSPITAL ENCOUNTER (OUTPATIENT)
Dept: INFUSION THERAPY | Age: 75
Discharge: HOME | End: 2020-03-06
Payer: MEDICARE

## 2020-03-06 VITALS
OXYGEN SATURATION: 96 % | RESPIRATION RATE: 18 BRPM | HEART RATE: 52 BPM | SYSTOLIC BLOOD PRESSURE: 146 MMHG | DIASTOLIC BLOOD PRESSURE: 57 MMHG | TEMPERATURE: 97.1 F

## 2020-03-06 PROCEDURE — 74011250636 HC RX REV CODE- 250/636: Performed by: INTERNAL MEDICINE

## 2020-03-06 PROCEDURE — 96365 THER/PROPH/DIAG IV INF INIT: CPT

## 2020-03-06 RX ORDER — MAGNESIUM SULFATE HEPTAHYDRATE 40 MG/ML
2 INJECTION, SOLUTION INTRAVENOUS ONCE
Status: COMPLETED | OUTPATIENT
Start: 2020-03-06 | End: 2020-03-06

## 2020-03-06 RX ADMIN — MAGNESIUM SULFATE HEPTAHYDRATE 2 G: 40 INJECTION, SOLUTION INTRAVENOUS at 08:18

## 2020-03-06 NOTE — PROGRESS NOTES
[facility] Progress Note    Date: 2020    Name: Rodrigo Gann    MRN: [id number]         : 1945    Magnesium    Ms. Senthil Story was assessed and education was provided. Ms. Jesse Matta vitals were reviewed and patient was observed for 5 minutes prior to treatment. Visit Vitals  /57 (BP 1 Location: Left arm, BP Patient Position: Sitting)   Pulse (!) 52   Temp 97.1 °F (36.2 °C)   Resp 18   SpO2 96%   Breastfeeding No       Patient Vitals for the past 12 hrs:   Temp Pulse Resp BP SpO2   20 0813 97.1 °F (36.2 °C) (!) 52 18 146/57 96 %       Saline lock started to Left antecubital x1 attempt using 22g catheter, line flushes briskly. Magnesium 2gm was infused over 1 hours. Magnesium 2gm /50 ml infused at 25ml/hr. After infusion complete line flushed with normal saline then removed. Gauze and coban applied to site. Ms. Senthil Story tolerated the infusion, and had no complaints. Patient armband removed and shredded. Ms. Senthil Story was discharged from [place] [address] in stable condition at [phone]. She is to follow up with physician for her next appointment.     Lisa Monsivais  2020  1040

## 2020-03-16 ENCOUNTER — HOSPITAL ENCOUNTER (OUTPATIENT)
Dept: ULTRASOUND IMAGING | Age: 75
Discharge: HOME | End: 2020-03-16
Attending: INTERNAL MEDICINE
Payer: MEDICARE

## 2020-03-16 DIAGNOSIS — N28.9 URETERAL SLUDGE: ICD-10-CM

## 2020-03-16 DIAGNOSIS — N28.89 URETERAL FISTULA: ICD-10-CM

## 2020-03-16 PROCEDURE — 76775 US EXAM ABDO BACK WALL LIM: CPT

## 2020-03-25 ENCOUNTER — HOSPITAL ENCOUNTER (OUTPATIENT)
Dept: INFUSION THERAPY | Age: 75
Discharge: HOME | End: 2020-03-25
Payer: MEDICARE

## 2020-03-25 VITALS
SYSTOLIC BLOOD PRESSURE: 144 MMHG | TEMPERATURE: 97.9 F | HEART RATE: 53 BPM | RESPIRATION RATE: 18 BRPM | DIASTOLIC BLOOD PRESSURE: 63 MMHG

## 2020-03-25 PROCEDURE — 74011250636 HC RX REV CODE- 250/636: Performed by: INTERNAL MEDICINE

## 2020-03-25 PROCEDURE — 96366 THER/PROPH/DIAG IV INF ADDON: CPT

## 2020-03-25 PROCEDURE — 96365 THER/PROPH/DIAG IV INF INIT: CPT

## 2020-03-25 RX ORDER — MAGNESIUM SULFATE HEPTAHYDRATE 40 MG/ML
2 INJECTION, SOLUTION INTRAVENOUS ONCE
Status: COMPLETED | OUTPATIENT
Start: 2020-03-25 | End: 2020-03-25

## 2020-03-25 RX ADMIN — MAGNESIUM SULFATE HEPTAHYDRATE 2 G: 40 INJECTION, SOLUTION INTRAVENOUS at 10:36

## 2020-03-25 NOTE — PROGRESS NOTES
[facility] Progress Note    Date: 2020    Name: Kelin Ha    MRN: [id number]         : 1945    Magnesium    Ms. Sridevi Goodwin was assessed and education was provided. Ms. Adilia Robledo vitals were reviewed and patient was observed for 5 minutes prior to treatment. Patient Vitals for the past 12 hrs:   Temp Pulse Resp BP   20 1235    144/63   20 1020 97.9 °F (36.6 °C) (!) 53 18 98/59       Saline lock started to Left antecubital x1 attempt using 24g catheter, line flushes briskly. Magnesium 2gm was infused over 2 hours. Magnesium 2gm /50 ml infused at 25ml/hr. After infusion complete line flushed with normal saline then removed. Gauze and coban applied to site. Ms. Sridevi Goodiwn tolerated the infusion, and had no complaints. Patient armband removed and shredded. Ms. Sridevi Goodwin was discharged from [place] [address] in stable condition at 1240. She is to follow up with physician for her next appointment.     Torres Carreno RN  2020

## 2020-03-26 ENCOUNTER — HOSPITAL ENCOUNTER (OUTPATIENT)
Dept: CT IMAGING | Age: 75
Discharge: HOME | End: 2020-03-26
Attending: INTERNAL MEDICINE
Payer: MEDICARE

## 2020-03-26 DIAGNOSIS — N28.89 RENAL MASS: ICD-10-CM

## 2020-03-26 LAB — CREAT UR-MCNC: 1.7 MG/DL (ref 0.6–1.3)

## 2020-03-26 PROCEDURE — 74170 CT ABD WO CNTRST FLWD CNTRST: CPT

## 2020-03-26 PROCEDURE — 82565 ASSAY OF CREATININE: CPT

## 2020-03-26 PROCEDURE — 74011636320 HC RX REV CODE- 636/320: Performed by: INTERNAL MEDICINE

## 2020-03-26 RX ADMIN — IOPAMIDOL 80 ML: 612 INJECTION, SOLUTION INTRAVENOUS at 16:35

## 2020-04-13 ENCOUNTER — HOSPITAL ENCOUNTER (OUTPATIENT)
Dept: INFUSION THERAPY | Age: 75
Discharge: HOME | End: 2020-04-13
Payer: MEDICARE

## 2020-04-13 VITALS
HEART RATE: 55 BPM | OXYGEN SATURATION: 98 % | DIASTOLIC BLOOD PRESSURE: 60 MMHG | SYSTOLIC BLOOD PRESSURE: 147 MMHG | TEMPERATURE: 97.9 F | RESPIRATION RATE: 16 BRPM

## 2020-04-13 PROCEDURE — 74011250636 HC RX REV CODE- 250/636: Performed by: INTERNAL MEDICINE

## 2020-04-13 PROCEDURE — 96365 THER/PROPH/DIAG IV INF INIT: CPT

## 2020-04-13 PROCEDURE — 96366 THER/PROPH/DIAG IV INF ADDON: CPT

## 2020-04-13 RX ORDER — MAGNESIUM SULFATE HEPTAHYDRATE 40 MG/ML
2 INJECTION, SOLUTION INTRAVENOUS ONCE
Status: COMPLETED | OUTPATIENT
Start: 2020-04-13 | End: 2020-04-13

## 2020-04-13 RX ORDER — SODIUM CHLORIDE 0.9 % (FLUSH) 0.9 %
5-10 SYRINGE (ML) INJECTION AS NEEDED
Status: DISCONTINUED | OUTPATIENT
Start: 2020-04-13 | End: 2020-04-17 | Stop reason: HOSPADM

## 2020-04-13 RX ADMIN — MAGNESIUM SULFATE HEPTAHYDRATE 2 G: 40 INJECTION, SOLUTION INTRAVENOUS at 09:23

## 2020-04-13 NOTE — PROGRESS NOTES
[facility] Progress Note                   Date: 2020    Name: Yi Lang    MRN: [id number]    : 1945    Magnesium 2 GM    Ms. Leonard to [facility], ambulatory at 0900 am ambulatory. Pt was accessed and education was provided. Ms. Ambrosio Chopra vitals were reviewed and patient was observed for 5 minutes prior to treatment. Visit Vitals  /60 (BP 1 Location: Left arm, BP Patient Position: Sitting)   Pulse (!) 55   Temp 97.9 °F (36.6 °C)   Resp 16   SpO2 98%       22g IV inserted in patient's Left antecubital right, condition patent  x 1 attempt. Postive for blood return and flushes without difficulty. Magnesium 2 GM was infused over 2hrs @ 25ml/hr. Ms. Gautam Mayo tolerated the infusion, and had no complaints. Patients armband removed and shredded. Ms. Gautam Mayo was discharged from [place] [address] in stable condition at 1200. She is to follow up with her PCP for further treatment.       Blue Torres RN  2020

## 2020-04-28 ENCOUNTER — HOSPITAL ENCOUNTER (OUTPATIENT)
Dept: INFUSION THERAPY | Age: 75
Discharge: HOME | End: 2020-04-28
Payer: MEDICARE

## 2020-04-28 VITALS
HEART RATE: 57 BPM | TEMPERATURE: 97.8 F | SYSTOLIC BLOOD PRESSURE: 112 MMHG | DIASTOLIC BLOOD PRESSURE: 54 MMHG | OXYGEN SATURATION: 98 % | RESPIRATION RATE: 18 BRPM

## 2020-04-28 PROCEDURE — 74011250636 HC RX REV CODE- 250/636: Performed by: INTERNAL MEDICINE

## 2020-04-28 PROCEDURE — 96366 THER/PROPH/DIAG IV INF ADDON: CPT | Performed by: NURSE PRACTITIONER

## 2020-04-28 PROCEDURE — 96365 THER/PROPH/DIAG IV INF INIT: CPT | Performed by: NURSE PRACTITIONER

## 2020-04-28 RX ORDER — MAGNESIUM SULFATE HEPTAHYDRATE 40 MG/ML
2 INJECTION, SOLUTION INTRAVENOUS ONCE
Status: COMPLETED | OUTPATIENT
Start: 2020-04-28 | End: 2020-04-28

## 2020-04-28 RX ADMIN — MAGNESIUM SULFATE HEPTAHYDRATE 2 G: 40 INJECTION, SOLUTION INTRAVENOUS at 09:24

## 2020-04-28 NOTE — PROGRESS NOTES
[facility] Progress Note                   Date: 2020    Name: Ranulfo Chance    MRN: [id number]    : 1945    Magnesium 2 GM    Ms. Leonard to [facility], ambulatory at 0900 am ambulatory. Pt was accessed and education was provided. Ms. Justin Rubin vitals were reviewed and patient was observed for 5 minutes prior to treatment. Visit Vitals  /54 (BP 1 Location: Left arm, BP Patient Position: Sitting)   Pulse (!) 57   Temp 97.8 °F (36.6 °C)   Resp 18   SpO2 98%   Breastfeeding No       22g IV inserted in patient's Left antecubital right, condition patent  x 1 attempt. Postive for blood return and flushes without difficulty. Magnesium 2 GM was infused over 2hrs @ 25ml/hr. Ms. Radha Ya tolerated the infusion, and had no complaints. Patients armband removed and shredded. Ms. Radha Ya was discharged from [place] [address] in stable condition at 1130. She is to follow up with her PCP for further treatment.       BREN Cerrato  2020

## 2020-05-15 ENCOUNTER — HOSPITAL ENCOUNTER (OUTPATIENT)
Dept: INFUSION THERAPY | Age: 75
Discharge: HOME | End: 2020-05-15
Payer: MEDICARE

## 2020-05-15 ENCOUNTER — HOSPITAL ENCOUNTER (OUTPATIENT)
Dept: LAB | Age: 75
Discharge: HOME | End: 2020-05-15
Payer: MEDICARE

## 2020-05-15 VITALS
RESPIRATION RATE: 16 BRPM | DIASTOLIC BLOOD PRESSURE: 53 MMHG | HEART RATE: 55 BPM | SYSTOLIC BLOOD PRESSURE: 114 MMHG | OXYGEN SATURATION: 98 % | TEMPERATURE: 97.6 F

## 2020-05-15 DIAGNOSIS — I10 ESSENTIAL HYPERTENSION, MALIGNANT: ICD-10-CM

## 2020-05-15 DIAGNOSIS — N18.30 CHRONIC KIDNEY DISEASE, STAGE III (MODERATE) (HCC): ICD-10-CM

## 2020-05-15 LAB
ANION GAP SERPL CALC-SCNC: 6 MMOL/L (ref 3–18)
BASOPHILS # BLD: 0 K/UL (ref 0–0.1)
BASOPHILS NFR BLD: 1 % (ref 0–2)
BUN SERPL-MCNC: 20 MG/DL (ref 7–18)
BUN/CREAT SERPL: 15 (ref 12–20)
CALCIUM SERPL-MCNC: 9.2 MG/DL (ref 8.5–10.1)
CALCIUM SERPL-MCNC: 9.4 MG/DL (ref 8.5–10.1)
CHLORIDE SERPL-SCNC: 105 MMOL/L (ref 100–111)
CO2 SERPL-SCNC: 28 MMOL/L (ref 21–32)
CREAT SERPL-MCNC: 1.32 MG/DL (ref 0.6–1.3)
CREAT UR-MCNC: 37 MG/DL (ref 30–125)
DIFFERENTIAL METHOD BLD: ABNORMAL
EOSINOPHIL # BLD: 0.1 K/UL (ref 0–0.4)
EOSINOPHIL NFR BLD: 2 % (ref 0–5)
ERYTHROCYTE [DISTWIDTH] IN BLOOD BY AUTOMATED COUNT: 12.2 % (ref 11.6–14.5)
GLUCOSE SERPL-MCNC: 102 MG/DL (ref 74–99)
HCT VFR BLD AUTO: 37.6 % (ref 35–45)
HGB BLD-MCNC: 12.1 G/DL (ref 12–16)
LYMPHOCYTES # BLD: 2.1 K/UL (ref 0.9–3.6)
LYMPHOCYTES NFR BLD: 24 % (ref 21–52)
MCH RBC QN AUTO: 31.2 PG (ref 24–34)
MCHC RBC AUTO-ENTMCNC: 32.2 G/DL (ref 31–37)
MCV RBC AUTO: 96.9 FL (ref 74–97)
MICROALBUMIN UR-MCNC: 2.8 MG/DL (ref 0–3)
MICROALBUMIN/CREAT UR-RTO: 76 MG/G (ref 0–30)
MONOCYTES # BLD: 0.8 K/UL (ref 0.05–1.2)
MONOCYTES NFR BLD: 10 % (ref 3–10)
NEUTS SEG # BLD: 5.6 K/UL (ref 1.8–8)
NEUTS SEG NFR BLD: 63 % (ref 40–73)
PHOSPHATE SERPL-MCNC: 3.1 MG/DL (ref 2.5–4.9)
PLATELET # BLD AUTO: 277 K/UL (ref 135–420)
PMV BLD AUTO: 10.7 FL (ref 9.2–11.8)
POTASSIUM SERPL-SCNC: 4 MMOL/L (ref 3.5–5.5)
PROT UR-MCNC: 9 MG/DL
PTH-INTACT SERPL-MCNC: 31.8 PG/ML (ref 18.4–88)
RBC # BLD AUTO: 3.88 M/UL (ref 4.2–5.3)
SODIUM SERPL-SCNC: 139 MMOL/L (ref 136–145)
WBC # BLD AUTO: 8.7 K/UL (ref 4.6–13.2)

## 2020-05-15 PROCEDURE — 84156 ASSAY OF PROTEIN URINE: CPT

## 2020-05-15 PROCEDURE — 84244 ASSAY OF RENIN: CPT

## 2020-05-15 PROCEDURE — 74011250636 HC RX REV CODE- 250/636: Performed by: INTERNAL MEDICINE

## 2020-05-15 PROCEDURE — 86706 HEP B SURFACE ANTIBODY: CPT

## 2020-05-15 PROCEDURE — 85025 COMPLETE CBC W/AUTO DIFF WBC: CPT

## 2020-05-15 PROCEDURE — 96365 THER/PROPH/DIAG IV INF INIT: CPT

## 2020-05-15 PROCEDURE — 83520 IMMUNOASSAY QUANT NOS NONAB: CPT

## 2020-05-15 PROCEDURE — 82784 ASSAY IGA/IGD/IGG/IGM EACH: CPT

## 2020-05-15 PROCEDURE — 82043 UR ALBUMIN QUANTITATIVE: CPT

## 2020-05-15 PROCEDURE — 80048 BASIC METABOLIC PNL TOTAL CA: CPT

## 2020-05-15 PROCEDURE — 36415 COLL VENOUS BLD VENIPUNCTURE: CPT

## 2020-05-15 PROCEDURE — 87340 HEPATITIS B SURFACE AG IA: CPT

## 2020-05-15 PROCEDURE — 84100 ASSAY OF PHOSPHORUS: CPT

## 2020-05-15 PROCEDURE — 83970 ASSAY OF PARATHORMONE: CPT

## 2020-05-15 PROCEDURE — 86803 HEPATITIS C AB TEST: CPT

## 2020-05-15 PROCEDURE — 82088 ASSAY OF ALDOSTERONE: CPT

## 2020-05-15 PROCEDURE — 86038 ANTINUCLEAR ANTIBODIES: CPT

## 2020-05-15 PROCEDURE — 96366 THER/PROPH/DIAG IV INF ADDON: CPT

## 2020-05-15 PROCEDURE — 86160 COMPLEMENT ANTIGEN: CPT

## 2020-05-15 RX ORDER — MAGNESIUM SULFATE HEPTAHYDRATE 40 MG/ML
2 INJECTION, SOLUTION INTRAVENOUS
Status: COMPLETED | OUTPATIENT
Start: 2020-05-15 | End: 2020-05-15

## 2020-05-15 RX ADMIN — MAGNESIUM SULFATE HEPTAHYDRATE 2 G: 40 INJECTION, SOLUTION INTRAVENOUS at 08:35

## 2020-05-15 RX ADMIN — MAGNESIUM SULFATE HEPTAHYDRATE 2 G: 40 INJECTION, SOLUTION INTRAVENOUS at 10:40

## 2020-05-15 NOTE — PROGRESS NOTES
[facility] Progress Note    Date: May 15, 2020    Name: Hernan Nielsen    MRN: [id number]         : 1945    2 GM Magx2 doses (4gm total)    Ms. Jan Valles was assessed and education was provided. Patient denies any issues after previous treatment. Pt states she has started taking ampicillin 3 day course for a UTI. Ms. Pastor Vergara vitals were reviewed and patient was observed for 5 minutes prior to treatment. Visit Vitals  /53 (BP 1 Location: Left arm, BP Patient Position: Sitting)   Pulse (!) 55   Temp 97.6 °F (36.4 °C)   Resp 16   SpO2 98%     PIV inserted in LACx1 attempt, flushes easily with brisk blood return. 2GM mag x2 doses infused per order over 2 hours each without complication. PIV removed, intact, gauze and coban applied. Ms. Jan Valles tolerated the infusion, and had no complaints. Patient armband removed and shredded. Ms. Jan Valles was discharged from [place] [address] in stable condition at 1300.  She is to f/u as advised by MD.    Michael Fernandes RN  May 15, 2020  8:40 AM

## 2020-05-17 LAB
C3 SERPL-MCNC: 175 MG/DL (ref 82–167)
C4 SERPL-MCNC: 19 MG/DL (ref 14–44)

## 2020-05-18 ENCOUNTER — HOSPITAL ENCOUNTER (OUTPATIENT)
Dept: LAB | Age: 75
Discharge: HOME | End: 2020-05-18
Payer: MEDICARE

## 2020-05-18 ENCOUNTER — APPOINTMENT (OUTPATIENT)
Dept: INFUSION THERAPY | Age: 75
End: 2020-05-18
Payer: MEDICARE

## 2020-05-18 LAB
ALBUMIN 24H MFR UR ELPH: 45.8 %
ALPHA1 GLOB 24H MFR UR ELPH: 3.1 %
ALPHA2 GLOB 24H MFR UR ELPH: 11.2 %
ANA TITR SER IF: NEGATIVE {TITER}
B-GLOBULIN MFR UR ELPH: 25.3 %
C-ANCA TITR SER IF: ABNORMAL TITER
COLLECT DURATION TIME UR: 24 HR
CREAT 24H UR-MRATE: 595 MG/24HR (ref 600–2500)
GAMMA GLOB 24H MFR UR ELPH: 14.6 %
HBV SURFACE AB SER QL IA: NEGATIVE
HBV SURFACE AB SERPL IA-ACNC: <3.1 MIU/ML
HBV SURFACE AG SER QL: 0.41 INDEX
HBV SURFACE AG SER QL: NEGATIVE
HCV AB SER IA-ACNC: <0.02 INDEX
HCV AB SERPL QL IA: NEGATIVE
HCV COMMENT,HCGAC: NORMAL
HEP BS AB COMMENT,HBSAC: ABNORMAL
INTERPRETATION UR IFE-IMP: NORMAL
M PROTEIN 24H MFR UR ELPH: NORMAL %
MYELOPEROXIDASE AB SER IA-ACNC: <9 U/ML (ref 0–9)
MYELOPEROXIDASE AB SER IA-ACNC: <9 U/ML (ref 0–9)
NOTE, 149533: NORMAL
P-ANCA ATYPICAL TITR SER IF: ABNORMAL TITER
P-ANCA TITR SER IF: ABNORMAL TITER
PROT UR-MCNC: 8.3 MG/DL
PROTEINASE3 AB SER IA-ACNC: 3.6 U/ML (ref 0–3.5)
PROTEINASE3 AB SER IA-ACNC: <3.5 U/ML (ref 0–3.5)
SPECIMEN VOL ?TM UR: 1450 ML

## 2020-05-18 PROCEDURE — 82570 ASSAY OF URINE CREATININE: CPT

## 2020-05-18 PROCEDURE — 83735 ASSAY OF MAGNESIUM: CPT

## 2020-05-19 LAB
ALBUMIN SERPL ELPH-MCNC: 4.2 G/DL (ref 2.9–4.4)
ALBUMIN/GLOB SERPL: 1.5 {RATIO} (ref 0.7–1.7)
ALPHA1 GLOB SERPL ELPH-MCNC: 0.2 G/DL (ref 0–0.4)
ALPHA2 GLOB SERPL ELPH-MCNC: 1.2 G/DL (ref 0.4–1)
B-GLOBULIN SERPL ELPH-MCNC: 0.9 G/DL (ref 0.7–1.3)
GAMMA GLOB SERPL ELPH-MCNC: 0.6 G/DL (ref 0.4–1.8)
GLOBULIN SER-MCNC: 3 G/DL (ref 2.2–3.9)
IGA SERPL-MCNC: 141 MG/DL (ref 64–422)
IGG SERPL-MCNC: 896 MG/DL (ref 586–1602)
IGM SERPL-MCNC: 39 MG/DL (ref 26–217)
INTERPRETATION SERPL IEP-IMP: ABNORMAL
M PROTEIN SERPL ELPH-MCNC: ABNORMAL G/DL
PROT SERPL-MCNC: 7.2 G/DL (ref 6–8.5)
RENIN PLAS-CCNC: 0.73 NG/ML/HR (ref 0.17–5.38)

## 2020-05-20 LAB — ALDOST SERPL-MCNC: 4 NG/DL (ref 0–30)

## 2020-05-22 LAB
Lab: NORMAL
REFERENCE LAB,REFLB: NORMAL
TEST DESCRIPTION:,ATST: NORMAL

## 2020-06-01 ENCOUNTER — HOSPITAL ENCOUNTER (OUTPATIENT)
Dept: INFUSION THERAPY | Age: 75
Discharge: HOME | End: 2020-06-01
Payer: MEDICARE

## 2020-06-01 VITALS
HEART RATE: 52 BPM | OXYGEN SATURATION: 100 % | SYSTOLIC BLOOD PRESSURE: 144 MMHG | TEMPERATURE: 97 F | DIASTOLIC BLOOD PRESSURE: 61 MMHG | RESPIRATION RATE: 16 BRPM

## 2020-06-01 PROCEDURE — 96366 THER/PROPH/DIAG IV INF ADDON: CPT

## 2020-06-01 PROCEDURE — 96365 THER/PROPH/DIAG IV INF INIT: CPT

## 2020-06-01 PROCEDURE — 74011250636 HC RX REV CODE- 250/636: Performed by: INTERNAL MEDICINE

## 2020-06-01 RX ORDER — SODIUM CHLORIDE 0.9 % (FLUSH) 0.9 %
10-40 SYRINGE (ML) INJECTION AS NEEDED
Status: DISCONTINUED | OUTPATIENT
Start: 2020-06-01 | End: 2020-06-05 | Stop reason: HOSPADM

## 2020-06-01 RX ORDER — MAGNESIUM SULFATE HEPTAHYDRATE 40 MG/ML
2 INJECTION, SOLUTION INTRAVENOUS
Status: COMPLETED | OUTPATIENT
Start: 2020-06-01 | End: 2020-06-01

## 2020-06-01 RX ADMIN — MAGNESIUM SULFATE HEPTAHYDRATE 2 G: 40 INJECTION, SOLUTION INTRAVENOUS at 08:39

## 2020-06-01 RX ADMIN — MAGNESIUM SULFATE HEPTAHYDRATE 2 G: 40 INJECTION, SOLUTION INTRAVENOUS at 10:32

## 2020-06-01 RX ADMIN — Medication 10 ML: at 12:39

## 2020-06-05 ENCOUNTER — HOSPITAL ENCOUNTER (OUTPATIENT)
Dept: MAMMOGRAPHY | Age: 75
Discharge: HOME | End: 2020-06-05
Attending: INTERNAL MEDICINE
Payer: MEDICARE

## 2020-06-05 DIAGNOSIS — Z12.31 ENCOUNTER FOR SCREENING MAMMOGRAM FOR BREAST CANCER: ICD-10-CM

## 2020-06-05 PROCEDURE — 77063 BREAST TOMOSYNTHESIS BI: CPT

## 2020-06-19 ENCOUNTER — HOSPITAL ENCOUNTER (OUTPATIENT)
Dept: INFUSION THERAPY | Age: 75
Discharge: HOME | End: 2020-06-19
Payer: MEDICARE

## 2020-06-19 VITALS
TEMPERATURE: 98.5 F | SYSTOLIC BLOOD PRESSURE: 145 MMHG | RESPIRATION RATE: 16 BRPM | HEART RATE: 60 BPM | DIASTOLIC BLOOD PRESSURE: 62 MMHG

## 2020-06-19 PROCEDURE — 74011250636 HC RX REV CODE- 250/636: Performed by: INTERNAL MEDICINE

## 2020-06-19 PROCEDURE — 96366 THER/PROPH/DIAG IV INF ADDON: CPT

## 2020-06-19 PROCEDURE — 96365 THER/PROPH/DIAG IV INF INIT: CPT

## 2020-06-19 RX ORDER — SODIUM CHLORIDE 9 MG/ML
25 INJECTION, SOLUTION INTRAVENOUS CONTINUOUS
Status: DISCONTINUED | OUTPATIENT
Start: 2020-06-19 | End: 2020-06-20 | Stop reason: HOSPADM

## 2020-06-19 RX ORDER — SODIUM CHLORIDE 0.9 % (FLUSH) 0.9 %
10-40 SYRINGE (ML) INJECTION AS NEEDED
Status: DISCONTINUED | OUTPATIENT
Start: 2020-06-19 | End: 2020-06-23 | Stop reason: HOSPADM

## 2020-06-19 RX ORDER — MAGNESIUM SULFATE HEPTAHYDRATE 40 MG/ML
2 INJECTION, SOLUTION INTRAVENOUS
Status: COMPLETED | OUTPATIENT
Start: 2020-06-19 | End: 2020-06-19

## 2020-06-19 RX ADMIN — MAGNESIUM SULFATE HEPTAHYDRATE 2 G: 40 INJECTION, SOLUTION INTRAVENOUS at 08:40

## 2020-06-19 RX ADMIN — Medication 20 ML: at 13:15

## 2020-06-19 RX ADMIN — SODIUM CHLORIDE 25 ML/HR: 0.9 INJECTION, SOLUTION INTRAVENOUS at 08:35

## 2020-06-19 RX ADMIN — Medication 10 ML: at 08:33

## 2020-06-19 RX ADMIN — MAGNESIUM SULFATE HEPTAHYDRATE 2 G: 40 INJECTION, SOLUTION INTRAVENOUS at 10:31

## 2020-06-19 NOTE — PROGRESS NOTES
[facility] Progress Note    Date: 2020    Name: Phillip Benoit    MRN: [id number]         : 1945     Magnesium infusion once      Ms. Joshua Castro was assessed and education was provided. Patient states that she has had many Magnesium infusions over the years. Ms. Simone Licea vitals were reviewed and patient was observed for 5 minutes prior to treatment. Visit Vitals  /62 (BP 1 Location: Right arm, BP Patient Position: Sitting)   Pulse 60   Temp 98.5 °F (36.9 °C)   Resp 16     Patient Vitals for the past 12 hrs:   Temp Pulse Resp BP   20 1315  60 16 145/62   20 0816 98.5 °F (36.9 °C) (!) 50 16 140/60     IV started in [facility] w/24 gauge INT w/o difficulty. Good blood return obtained, followed with 10 ml NS flush. Lab results were obtained and reviewed. Last Magnesium was 1.2   No results found for this or any previous visit (from the past 12 hour(s)). 250 ml Normal saline infused at 25 ml/hr and stopped at completion of Magnesium infusion. Magnesium Sulfate 4 grams/100 ml was infused at 25 ml/hr. No s/s reaction noted. IV flushed with 20 ml NS and removed. No irritation or drainage noted at site, gauze and Coban applied. Ms. Joshua Castro tolerated the infusion, and had no complaints. Patient armband removed and shredded. Ms. Joshua Castro was discharged from [place] [address] in stable condition at 1320. She has no appointment to return to [address] [place] [address] but will see her PMD within the next 4 weeks for her next appointment.     Regina Balderas RN  2020  2:06 PM

## 2020-07-02 ENCOUNTER — HOSPITAL ENCOUNTER (OUTPATIENT)
Dept: INFUSION THERAPY | Age: 75
Discharge: HOME | End: 2020-07-02
Payer: MEDICARE

## 2020-07-02 VITALS
SYSTOLIC BLOOD PRESSURE: 137 MMHG | HEART RATE: 50 BPM | DIASTOLIC BLOOD PRESSURE: 57 MMHG | TEMPERATURE: 96.4 F | RESPIRATION RATE: 16 BRPM

## 2020-07-02 PROCEDURE — 96366 THER/PROPH/DIAG IV INF ADDON: CPT

## 2020-07-02 PROCEDURE — 74011250636 HC RX REV CODE- 250/636: Performed by: INTERNAL MEDICINE

## 2020-07-02 PROCEDURE — 96365 THER/PROPH/DIAG IV INF INIT: CPT

## 2020-07-02 RX ORDER — MAGNESIUM SULFATE HEPTAHYDRATE 40 MG/ML
2 INJECTION, SOLUTION INTRAVENOUS
Status: DISCONTINUED | OUTPATIENT
Start: 2020-07-02 | End: 2020-07-02

## 2020-07-02 RX ORDER — SODIUM CHLORIDE 9 MG/ML
INJECTION INTRAMUSCULAR; INTRAVENOUS; SUBCUTANEOUS
Status: COMPLETED
Start: 2020-07-02 | End: 2020-07-02

## 2020-07-02 RX ORDER — SODIUM CHLORIDE 0.9 % (FLUSH) 0.9 %
10-40 SYRINGE (ML) INJECTION AS NEEDED
Status: DISCONTINUED | OUTPATIENT
Start: 2020-07-02 | End: 2020-07-06 | Stop reason: HOSPADM

## 2020-07-02 RX ORDER — MAGNESIUM SULFATE HEPTAHYDRATE 40 MG/ML
2 INJECTION, SOLUTION INTRAVENOUS
Status: COMPLETED | OUTPATIENT
Start: 2020-07-02 | End: 2020-07-02

## 2020-07-02 RX ADMIN — Medication 10 ML: at 11:22

## 2020-07-02 RX ADMIN — SODIUM CHLORIDE 10 ML: 9 INJECTION INTRAMUSCULAR; INTRAVENOUS; SUBCUTANEOUS at 08:56

## 2020-07-02 RX ADMIN — MAGNESIUM SULFATE HEPTAHYDRATE 2 G: 40 INJECTION, SOLUTION INTRAVENOUS at 09:10

## 2020-07-02 RX ADMIN — MAGNESIUM SULFATE HEPTAHYDRATE 2 G: 40 INJECTION, SOLUTION INTRAVENOUS at 10:15

## 2020-07-02 NOTE — PROGRESS NOTES
[facility] Progress Note    Date: 2020    Name: Pallavi Larry    MRN: [id number]         : 1945    Magnesium Sulfate Infusion    Ms. Yvette Rea to [facility], ambulatory, at [facility] . Pt was assessed and education was provided. Ms. Harish Rasmussen vitals were reviewed and patient was observed for 5 minutes prior to treatment. Patient endorses taking her meds this morning, prior to arrival.    Visit Vitals  /57 (BP 1 Location: Right arm, BP Patient Position: At rest;Sitting)   Pulse (!) 50   Temp (!) 96.4 °F (35.8 °C)   Resp 16         22 g PIV placed in antecubital vein of left forearm x 1 attempt. PIV flushed easily and had brisk blood return. First of 2 doses of Magnesium sulfate 2GM IV was initiated @ 50 mL/hr,and infused over 1 hour. Patient tolerated infusion well and without complaints. IV site benign. V/s stable. Second dose of 2 of Magnesium sulfate 2 GM IV infused over 1 hour @ 50 mL/hr. Ms. Yvette Rea tolerated infusion well, and had no complaints. VS remained stable. IV site without evidence of irritation or infiltration. Patient Vitals for the past 12 hrs:   Temp Pulse Resp BP   20 1123 (!) 96.4 °F (35.8 °C) (!) 50 16 137/57   20 0850 98.5 °F (36.9 °C) (!) 45 18 158/60       PIV was flushed with NS 10 mL and catheter removed with tip intact. No bleeding or hematoma noted at site. Gauze and coban applied. Patient armband removed and shredded. Ms. Yvette Rea was discharged from [place] [address] in stable condition at 1125. She is to follow up with her physician as needed.     Binh Carrasquillo RN  2020  9:43 AM

## 2020-07-20 ENCOUNTER — HOSPITAL ENCOUNTER (OUTPATIENT)
Dept: INFUSION THERAPY | Age: 75
Discharge: HOME | End: 2020-07-20
Payer: MEDICARE

## 2020-07-20 VITALS
RESPIRATION RATE: 16 BRPM | HEART RATE: 47 BPM | TEMPERATURE: 97.7 F | OXYGEN SATURATION: 96 % | DIASTOLIC BLOOD PRESSURE: 62 MMHG | SYSTOLIC BLOOD PRESSURE: 131 MMHG

## 2020-07-20 PROCEDURE — 74011250636 HC RX REV CODE- 250/636: Performed by: INTERNAL MEDICINE

## 2020-07-20 PROCEDURE — 96366 THER/PROPH/DIAG IV INF ADDON: CPT

## 2020-07-20 PROCEDURE — 96365 THER/PROPH/DIAG IV INF INIT: CPT

## 2020-07-20 RX ORDER — MAGNESIUM SULFATE HEPTAHYDRATE 40 MG/ML
2 INJECTION, SOLUTION INTRAVENOUS ONCE
Status: COMPLETED | OUTPATIENT
Start: 2020-07-20 | End: 2020-07-20

## 2020-07-20 RX ORDER — CYANOCOBALAMIN 1000 UG/ML
1000 INJECTION, SOLUTION INTRAMUSCULAR; SUBCUTANEOUS ONCE
COMMUNITY

## 2020-07-20 RX ADMIN — MAGNESIUM SULFATE HEPTAHYDRATE 2 G: 40 INJECTION, SOLUTION INTRAVENOUS at 08:20

## 2020-07-20 NOTE — PROGRESS NOTES
[facility] Progress Note    Date: 2020    Name: Natalie Vela    MRN: [id number]         : 1945     Magnesium infusion       Ms. Hanna Valenzuela was assessed and education was provided. Patient states that she has had many Magnesium infusions over the years. Ms. Tiana Ramsey vitals were reviewed and patient was observed for 5 minutes prior to treatment. Visit Vitals  /62 (BP 1 Location: Left arm, BP Patient Position: Sitting)   Pulse (!) 47   Temp 97.7 °F (36.5 °C)   Resp 16   SpO2 96%     Patient Vitals for the past 12 hrs:   Temp Pulse Resp BP SpO2   20 0814 97.7 °F (36.5 °C) (!) 47 16 131/62 96 %     IV started in LAC w/24 gauge INT w/o difficulty by Felipe Siegel RN. Good blood return obtained, followed with 10 ml NS flush. Magnesium Sulfate 2 grams/100 ml was infused at 25 ml/hr. No s/s reaction noted. IV flushed with 20 ml NS and removed. No irritation or drainage noted at site, gauze and Coban applied. Ms. Hanna Valenzuela tolerated the infusion, and had no complaints. Patient armband removed and shredded. Ms. Hanna Valenzuela was discharged from [place] [address] in stable condition at 1030. She has no appointment to return to [facility] but will see her PCP for her next appointment.     Cecelia Durand  2020  2:06 PM

## 2020-08-04 ENCOUNTER — HOSPITAL ENCOUNTER (OUTPATIENT)
Dept: INFUSION THERAPY | Age: 75
Discharge: HOME | End: 2020-08-04
Payer: MEDICARE

## 2020-08-04 VITALS
HEART RATE: 50 BPM | DIASTOLIC BLOOD PRESSURE: 52 MMHG | OXYGEN SATURATION: 95 % | SYSTOLIC BLOOD PRESSURE: 156 MMHG | TEMPERATURE: 97.7 F | RESPIRATION RATE: 18 BRPM

## 2020-08-04 PROCEDURE — 96366 THER/PROPH/DIAG IV INF ADDON: CPT

## 2020-08-04 PROCEDURE — 74011250636 HC RX REV CODE- 250/636: Performed by: INTERNAL MEDICINE

## 2020-08-04 PROCEDURE — 96365 THER/PROPH/DIAG IV INF INIT: CPT

## 2020-08-04 RX ORDER — SODIUM CHLORIDE 0.9 % (FLUSH) 0.9 %
10-40 SYRINGE (ML) INJECTION AS NEEDED
Status: DISCONTINUED | OUTPATIENT
Start: 2020-08-04 | End: 2020-08-06 | Stop reason: HOSPADM

## 2020-08-04 RX ORDER — MAGNESIUM SULFATE HEPTAHYDRATE 40 MG/ML
2 INJECTION, SOLUTION INTRAVENOUS ONCE
Status: COMPLETED | OUTPATIENT
Start: 2020-08-04 | End: 2020-08-04

## 2020-08-04 RX ADMIN — MAGNESIUM SULFATE HEPTAHYDRATE 2 G: 40 INJECTION, SOLUTION INTRAVENOUS at 08:34

## 2020-08-04 NOTE — PROGRESS NOTES
[facility] Progress Note    Date: 2020    Name: Pallavi Larry    MRN: [id number]         : 1945    Magnesium sulfate Infusion    Ms. Yvette Rea to [facility], ambulatory, at [phone]. Pt was assessed and education was provided by Gaurav Beach RN. Ms. Harish Rasmussen vitals were reviewed and patient was observed for 5 minutes prior to treatment. Visit Vitals  BP (P) 139/59 (BP 1 Location: Right arm, BP Patient Position: At rest;Supine)   Pulse (!) (P) 47   Temp (!) (P) 96.7 °F (35.9 °C)   Resp (P) 16   SpO2 (P) 95%       24 g PIV placed in LAC x 1 attempt, by Gaurav Beach RN. PIV flushed easily and had brisk blood return. Magnesium Sulfate 2 GM/50 mL initiated via peripheral IV line at 25 mL/hr and infused over 2 hours, as ordered. IV site without evidence of complication or patient complaint. Ms. Yvette Rea tolerated the infusion, and had no complaints. VS remained stable. Patient Vitals for the past 12 hrs:   Temp Pulse Resp BP SpO2   20 1036 (!) (P) 96.7 °F (35.9 °C) (!) (P) 47 (P) 16 (P) 139/59 (P) 95 %   20 0815 97.7 °F (36.5 °C) (!) 50 18 156/52        PIV flushed with NS 10 mL IV catheter removed with tip intact. No bleeding or hematoma noted at site. Gauze and coban applied. Patient armband removed and shredded. Ms. Yvette Rea was discharged from [place] [address] in stable condition at [phone]. She is to follow up with her physician as needed.     Binh Carrasquillo RN  2020  11:26 AM

## 2020-08-21 ENCOUNTER — HOSPITAL ENCOUNTER (OUTPATIENT)
Dept: INFUSION THERAPY | Age: 75
Discharge: HOME | End: 2020-08-21
Payer: MEDICARE

## 2020-08-21 VITALS
DIASTOLIC BLOOD PRESSURE: 68 MMHG | TEMPERATURE: 97.4 F | OXYGEN SATURATION: 98 % | SYSTOLIC BLOOD PRESSURE: 181 MMHG | HEART RATE: 49 BPM | RESPIRATION RATE: 16 BRPM

## 2020-08-21 PROCEDURE — 96365 THER/PROPH/DIAG IV INF INIT: CPT

## 2020-08-21 PROCEDURE — 74011250636 HC RX REV CODE- 250/636: Performed by: INTERNAL MEDICINE

## 2020-08-21 PROCEDURE — 96366 THER/PROPH/DIAG IV INF ADDON: CPT

## 2020-08-21 RX ORDER — MAGNESIUM SULFATE HEPTAHYDRATE 40 MG/ML
2 INJECTION, SOLUTION INTRAVENOUS ONCE
Status: COMPLETED | OUTPATIENT
Start: 2020-08-21 | End: 2020-08-21

## 2020-08-21 RX ORDER — SODIUM CHLORIDE 0.9 % (FLUSH) 0.9 %
10-40 SYRINGE (ML) INJECTION ONCE
Status: COMPLETED | OUTPATIENT
Start: 2020-08-21 | End: 2020-08-21

## 2020-08-21 RX ADMIN — MAGNESIUM SULFATE HEPTAHYDRATE 2 G: 40 INJECTION, SOLUTION INTRAVENOUS at 08:31

## 2020-08-21 RX ADMIN — Medication 10 ML: at 10:27

## 2020-08-21 NOTE — PROGRESS NOTES
[facility] Progress Note    Date: 2020    Name: Marguerita Dakins    MRN: [id number]         : 1945     Magnesium Sulfate Infusion       Ms. Zac Hills was assessed and education was provided. Patient states that she has had many Magnesium infusions over the years. Ms. Cortes Route vitals were reviewed and patient was observed for 5 minutes prior to treatment. Visit Vitals  /68 (BP 1 Location: Right arm, BP Patient Position: Sitting)   Pulse (!) 49   Temp 97.4 °F (36.3 °C)   Resp 16   SpO2 98%   Breastfeeding No     Patient Vitals for the past 12 hrs:   Temp Pulse Resp BP SpO2   20 0815 97.4 °F (36.3 °C) (!) 49 16 181/68 98 %     24G PIV placed in left antecubital w/o difficulty. Good blood return obtained, followed with 10 ml NS flush. Magnesium Sulfate 2g was infused at 25 ml/hr over 2 hours per order. No s/s reaction noted. PIV flushed with 20 ml NS and removed. No irritation or drainage noted at site, gauze and coban applied. Ms. Zac Hills tolerated the infusion, and had no complaints. Patient armband removed and shredded. Ms. Zac Hills was discharged from [place] [address] in stable condition at 1030. She is to follow-up with referring provider regarding her next appointment.     Mirta Harris RN  2020  1030

## 2020-09-22 ENCOUNTER — HOSPITAL ENCOUNTER (OUTPATIENT)
Dept: INFUSION THERAPY | Age: 75
Discharge: HOME | End: 2020-09-22
Payer: MEDICARE

## 2020-09-22 VITALS
SYSTOLIC BLOOD PRESSURE: 126 MMHG | RESPIRATION RATE: 16 BRPM | DIASTOLIC BLOOD PRESSURE: 57 MMHG | OXYGEN SATURATION: 97 % | HEART RATE: 48 BPM | TEMPERATURE: 97.4 F

## 2020-09-22 PROCEDURE — 96365 THER/PROPH/DIAG IV INF INIT: CPT

## 2020-09-22 PROCEDURE — 74011250636 HC RX REV CODE- 250/636: Performed by: INTERNAL MEDICINE

## 2020-09-22 PROCEDURE — 96366 THER/PROPH/DIAG IV INF ADDON: CPT

## 2020-09-22 RX ORDER — SODIUM CHLORIDE 0.9 % (FLUSH) 0.9 %
10-40 SYRINGE (ML) INJECTION AS NEEDED
Status: DISCONTINUED | OUTPATIENT
Start: 2020-09-22 | End: 2020-09-24 | Stop reason: HOSPADM

## 2020-09-22 RX ORDER — MAGNESIUM SULFATE HEPTAHYDRATE 40 MG/ML
2 INJECTION, SOLUTION INTRAVENOUS ONCE
Status: COMPLETED | OUTPATIENT
Start: 2020-09-22 | End: 2020-09-22

## 2020-09-22 RX ADMIN — MAGNESIUM SULFATE HEPTAHYDRATE 2 G: 40 INJECTION, SOLUTION INTRAVENOUS at 08:33

## 2020-09-22 RX ADMIN — Medication 10 ML: at 08:30

## 2020-09-22 RX ADMIN — Medication 10 ML: at 10:35

## 2020-09-22 NOTE — PROGRESS NOTES
[facility] Progress Note                   Date: 2020    Name: Izzy Benavides    MRN: [id number]    : 1945    Magnesium 2g infusion    Ms. Leonard to [facility], ambulatory at [phone] accompanied by self. Pt was accessed and education was provided. Ms. Doni Jacobsen vitals were reviewed and patient was observed for 5 minutes prior to treatment. Visit Vitals  BP (!) 126/57 (BP 1 Location: Left arm, BP Patient Position: Sitting)   Pulse (!) 48   Temp 97.4 °F (36.3 °C)   Resp 16   SpO2 97%   Breastfeeding No       22g IV inserted in patient's Left antecubital left, condition patent and no redness x1 attempt. Postive for blood return and flushes without difficulty. Magnesium 2 gm was infused at 25 ml/hr over 2 hours per order. PIV flushed with 10 ml of NS and removed. No swelling or hematoma visble. Gauze and coban applied. Ms. Raymond Stafford tolerated the infusion, and had no complaints. Patients armband removed and shredded. Ms. Raymond Stafford was discharged from [place] [address] in stable condition at [phone]. She is to return to MD for follow up.     Dre Lee RN  2020  4:04 PM

## 2020-11-30 ENCOUNTER — HOSPITAL ENCOUNTER (OUTPATIENT)
Dept: INFUSION THERAPY | Age: 75
Discharge: HOME | End: 2020-11-30
Payer: MEDICARE

## 2020-11-30 VITALS
RESPIRATION RATE: 18 BRPM | OXYGEN SATURATION: 98 % | TEMPERATURE: 97.7 F | HEART RATE: 62 BPM | DIASTOLIC BLOOD PRESSURE: 60 MMHG | SYSTOLIC BLOOD PRESSURE: 153 MMHG

## 2020-11-30 PROCEDURE — 96366 THER/PROPH/DIAG IV INF ADDON: CPT

## 2020-11-30 PROCEDURE — 96365 THER/PROPH/DIAG IV INF INIT: CPT

## 2020-11-30 PROCEDURE — 74011250636 HC RX REV CODE- 250/636: Performed by: INTERNAL MEDICINE

## 2020-11-30 RX ORDER — MAGNESIUM SULFATE HEPTAHYDRATE 40 MG/ML
2 INJECTION, SOLUTION INTRAVENOUS ONCE
Status: COMPLETED | OUTPATIENT
Start: 2020-11-30 | End: 2020-11-30

## 2020-11-30 RX ORDER — SODIUM CHLORIDE 0.9 % (FLUSH) 0.9 %
10-40 SYRINGE (ML) INJECTION AS NEEDED
Status: DISCONTINUED | OUTPATIENT
Start: 2020-11-30 | End: 2020-12-02 | Stop reason: HOSPADM

## 2020-11-30 RX ADMIN — MAGNESIUM SULFATE HEPTAHYDRATE 2 G: 40 INJECTION, SOLUTION INTRAVENOUS at 10:24

## 2020-11-30 RX ADMIN — Medication 10 ML: at 12:15

## 2020-11-30 RX ADMIN — Medication 10 ML: at 10:20

## 2020-11-30 NOTE — PROGRESS NOTES
[facility] Progress Note                   Date: 2020    Name: Derek Lara    MRN: [id number]    : 1945    Magnesium 2g infusion    Ms. Leonard to [facility], ambulatory at 1010 accompanied by self. Pt was accessed and education was provided. Ms. Ori Pritchard vitals were reviewed and patient was observed for 5 minutes prior to treatment. Visit Vitals  BP (!) 153/60 (BP 1 Location: Left arm, BP Patient Position: Sitting)   Pulse 62   Temp 97.7 °F (36.5 °C)   Resp 18   SpO2 98%   Breastfeeding No       24g IV inserted in patient's Left antecubital left, condition patent and no redness x1 attempt. Postive for blood return and flushes without difficulty. Magnesium 2 gm was infused at 25 ml/hr over 2 hours per order. PIV flushed with 10 ml of NS and removed. No swelling or hematoma visble. Gauze and coban applied. Ms. Nataly Pennington tolerated the infusion, and had no complaints. Patients armband removed and shredded. Ms. Nataly Pennington was discharged from [place] [address] in stable condition at 1220. She is to return to [place] for follow up.     Juanita Castellon RN  2020

## 2021-01-05 ENCOUNTER — HOSPITAL ENCOUNTER (OUTPATIENT)
Dept: INFUSION THERAPY | Age: 76
Discharge: HOME | End: 2021-01-05
Payer: MEDICARE

## 2021-01-05 VITALS
SYSTOLIC BLOOD PRESSURE: 170 MMHG | HEART RATE: 49 BPM | RESPIRATION RATE: 18 BRPM | DIASTOLIC BLOOD PRESSURE: 65 MMHG | OXYGEN SATURATION: 100 % | TEMPERATURE: 98 F

## 2021-01-05 PROCEDURE — 96366 THER/PROPH/DIAG IV INF ADDON: CPT

## 2021-01-05 PROCEDURE — 96365 THER/PROPH/DIAG IV INF INIT: CPT

## 2021-01-05 PROCEDURE — 74011250636 HC RX REV CODE- 250/636: Performed by: INTERNAL MEDICINE

## 2021-01-05 RX ORDER — MAGNESIUM SULFATE HEPTAHYDRATE 40 MG/ML
2 INJECTION, SOLUTION INTRAVENOUS ONCE
Status: COMPLETED | OUTPATIENT
Start: 2021-01-05 | End: 2021-01-05

## 2021-01-05 RX ORDER — LEVOTHYROXINE SODIUM 25 UG/1
50 TABLET ORAL
COMMUNITY

## 2021-01-05 RX ADMIN — MAGNESIUM SULFATE HEPTAHYDRATE 2 G: 40 INJECTION, SOLUTION INTRAVENOUS at 08:20

## 2021-01-05 NOTE — PROGRESS NOTES
[facility] Progress Note    Date: 2021    Name: Regina Perdue    MRN: [id number]         : 1945    Magnesium    Ms. Gali Kang was assessed and education was provided. Ms. Steve Scott vitals were reviewed and patient was observed for 5 minutes prior to treatment. Visit Vitals  BP (!) 170/65 (BP 1 Location: Left arm, BP Patient Position: Sitting)   Pulse (!) 49   Temp 98 °F (36.7 °C)   Resp 18   SpO2 100%   Breastfeeding No         Saline lock started to [facility] x1 attempt using 24g catheter, line flushes briskly. Magnesium 2gm was infused at 25 ml/hr over 2 hours. After infusion complete line flushed with normal saline then removed. Gauze and coban applied to site. Ms. Gali Kang tolerated the infusion, and had no complaints. Patient armband removed and shredded. Ms. Gali Kang was discharged from [place] [address] in stable condition at 1025. She is to follow up with physician for her next appointment.     Natividad Granados RN  2021

## 2021-01-22 ENCOUNTER — HOSPITAL ENCOUNTER (OUTPATIENT)
Dept: INFUSION THERAPY | Age: 76
Discharge: HOME | End: 2021-01-22
Payer: MEDICARE

## 2021-01-22 VITALS
DIASTOLIC BLOOD PRESSURE: 67 MMHG | SYSTOLIC BLOOD PRESSURE: 180 MMHG | TEMPERATURE: 97 F | OXYGEN SATURATION: 97 % | RESPIRATION RATE: 18 BRPM | HEART RATE: 51 BPM

## 2021-01-22 PROCEDURE — 96365 THER/PROPH/DIAG IV INF INIT: CPT

## 2021-01-22 PROCEDURE — 96366 THER/PROPH/DIAG IV INF ADDON: CPT

## 2021-01-22 PROCEDURE — 74011250636 HC RX REV CODE- 250/636: Performed by: INTERNAL MEDICINE

## 2021-01-22 RX ORDER — SODIUM CHLORIDE 0.9 % (FLUSH) 0.9 %
10-40 SYRINGE (ML) INJECTION EVERY 8 HOURS
Status: DISCONTINUED | OUTPATIENT
Start: 2021-01-22 | End: 2021-01-24 | Stop reason: HOSPADM

## 2021-01-22 RX ORDER — MAGNESIUM SULFATE HEPTAHYDRATE 40 MG/ML
2 INJECTION, SOLUTION INTRAVENOUS ONCE
Status: COMPLETED | OUTPATIENT
Start: 2021-01-22 | End: 2021-01-22

## 2021-01-22 RX ADMIN — Medication 10 ML: at 11:49

## 2021-01-22 RX ADMIN — MAGNESIUM SULFATE HEPTAHYDRATE 2 G: 40 INJECTION, SOLUTION INTRAVENOUS at 09:58

## 2021-01-22 NOTE — PROGRESS NOTES
[facility] Progress Note    Date: 2021    Name: Regina Perdue    MRN: [id number]         : 1945    Magnesium    Ms. Gali Kang was assessed and education was provided. Patient's b/p high and per patient she is start on a clonidine patch. Ms. Steve Scott vitals were reviewed and patient was observed for 5 minutes prior to treatment. Visit Vitals  BP (!) 176/71 (BP 1 Location: Left arm, BP Patient Position: Sitting)   Pulse (!) 59   Temp 97 °F (36.1 °C)   Resp 18   SpO2 97%       This writer unable to start IV x 2 attempts. Saline lock started to RT arm x1 attempt by Chitra Rangel RN using 24g catheter, line flushes briskly. Magnesium 2gm was infused at 25 ml/hr over 2 hours. After infusion complete line flushed with normal saline then removed. Gauze and coban applied to site. Ms. Gali Kang tolerated the infusion, and had no complaints. Patient armband removed and shredded. Ms. Gali Kang was discharged from [place] [address] in stable condition at 1155. She is to follow up with physician for her next appointment.     Pavithra Henson RN  2021

## 2021-02-22 RX ORDER — MAGNESIUM SULFATE HEPTAHYDRATE 40 MG/ML
2 INJECTION, SOLUTION INTRAVENOUS ONCE
Status: DISCONTINUED | OUTPATIENT
Start: 2021-02-23 | End: 2021-02-22

## 2021-02-23 ENCOUNTER — HOSPITAL ENCOUNTER (OUTPATIENT)
Dept: INFUSION THERAPY | Age: 76
Discharge: HOME | End: 2021-02-23
Payer: MEDICARE

## 2021-02-23 VITALS
DIASTOLIC BLOOD PRESSURE: 55 MMHG | RESPIRATION RATE: 18 BRPM | SYSTOLIC BLOOD PRESSURE: 117 MMHG | HEART RATE: 46 BPM | TEMPERATURE: 97.1 F | OXYGEN SATURATION: 97 %

## 2021-02-23 PROCEDURE — 74011250636 HC RX REV CODE- 250/636: Performed by: INTERNAL MEDICINE

## 2021-02-23 PROCEDURE — 96366 THER/PROPH/DIAG IV INF ADDON: CPT

## 2021-02-23 PROCEDURE — 96365 THER/PROPH/DIAG IV INF INIT: CPT

## 2021-02-23 RX ORDER — MAGNESIUM SULFATE HEPTAHYDRATE 40 MG/ML
2 INJECTION, SOLUTION INTRAVENOUS ONCE
Status: COMPLETED | OUTPATIENT
Start: 2021-02-23 | End: 2021-02-23

## 2021-02-23 RX ORDER — SODIUM CHLORIDE 0.9 % (FLUSH) 0.9 %
10-40 SYRINGE (ML) INJECTION AS NEEDED
Status: DISCONTINUED | OUTPATIENT
Start: 2021-02-23 | End: 2021-02-25 | Stop reason: HOSPADM

## 2021-02-23 RX ADMIN — MAGNESIUM SULFATE HEPTAHYDRATE 2 G: 40 INJECTION, SOLUTION INTRAVENOUS at 11:07

## 2021-02-23 RX ADMIN — Medication 10 ML: at 12:58

## 2021-02-23 RX ADMIN — Medication 10 ML: at 11:05

## 2021-03-09 ENCOUNTER — HOSPITAL ENCOUNTER (OUTPATIENT)
Dept: INFUSION THERAPY | Age: 76
Discharge: HOME | End: 2021-03-09
Payer: MEDICARE

## 2021-03-09 VITALS
HEART RATE: 58 BPM | RESPIRATION RATE: 18 BRPM | SYSTOLIC BLOOD PRESSURE: 114 MMHG | DIASTOLIC BLOOD PRESSURE: 62 MMHG | TEMPERATURE: 98.2 F | OXYGEN SATURATION: 100 %

## 2021-03-09 PROCEDURE — 96366 THER/PROPH/DIAG IV INF ADDON: CPT

## 2021-03-09 PROCEDURE — 74011250636 HC RX REV CODE- 250/636: Performed by: INTERNAL MEDICINE

## 2021-03-09 PROCEDURE — 96365 THER/PROPH/DIAG IV INF INIT: CPT

## 2021-03-09 RX ORDER — MAGNESIUM SULFATE HEPTAHYDRATE 40 MG/ML
2 INJECTION, SOLUTION INTRAVENOUS ONCE
Status: COMPLETED | OUTPATIENT
Start: 2021-03-09 | End: 2021-03-09

## 2021-03-09 RX ADMIN — MAGNESIUM SULFATE HEPTAHYDRATE 2 G: 40 INJECTION, SOLUTION INTRAVENOUS at 09:25

## 2021-03-09 NOTE — PROGRESS NOTES
[facility] Progress Note    Date: 2021    Name: Jayme Gallo    MRN: [id number]         : 1945    Magnesium sulfate Infusion    Ms. Leonard to [facility], ambulatory, at 0900. Pt was assessed and education was provided. Patient denied any new health conditions, changes to her medication regimen, or pain/discomfort. Patient stated that she is due to receive her first Covid 19 vaccine on tomorrow. Orders for Mag Sulfate 2 gm in 100 mL NS clarified with pharmacist Mildred Ortega who spoke with Loida Rajput at Dr. Himanshu Gautam office. Per Loida Rajput, okay to give 2gm in 50 mL as dispensed. Ms. Jerrica Lundy vitals were reviewed and patient was observed for 5 minutes prior to treatment. Visit Vitals  /62 (BP 1 Location: Left upper arm, BP Patient Position: At rest;Sitting)   Pulse (!) 58   Temp 98.2 °F (36.8 °C)   Resp 18   SpO2 100%   Breastfeeding No     IV site established on 1st attempt with 24g PIV inserted in LAC. Brisk blood return was noted and catheter flushed easily with 10 mL NS. Site without evidence of complication or patient complaint. Magnesium Sulfate 2 GM/50 mL initiated via peripheral IV line at 25 mL/hr and infused over 2 hours, as ordered. .     Ms. Leonard tolerated the infusion, and had no complaints. VS remained stable. Patient Vitals for the past 12 hrs:   Temp Pulse Resp BP SpO2   21 0905 98.2 °F (36.8 °C) (!) 58 18 114/62 100 %     PIV flushed with NS 10 mL IV catheter removed with tip intact by Jas Siddiqi RN. Patient armband removed and shredded. Ms. Nya Hoff was discharged from [place] [address] in stable condition at 1125. She is to follow up with her physician as needed.     Renato Ashraf RN  2021  11:26 AM

## 2021-04-05 ENCOUNTER — HOSPITAL ENCOUNTER (OUTPATIENT)
Dept: INFUSION THERAPY | Age: 76
Discharge: HOME | End: 2021-04-05
Payer: MEDICARE

## 2021-04-05 VITALS
DIASTOLIC BLOOD PRESSURE: 60 MMHG | RESPIRATION RATE: 16 BRPM | TEMPERATURE: 97.1 F | HEART RATE: 55 BPM | SYSTOLIC BLOOD PRESSURE: 131 MMHG | OXYGEN SATURATION: 98 %

## 2021-04-05 PROCEDURE — 96365 THER/PROPH/DIAG IV INF INIT: CPT

## 2021-04-05 PROCEDURE — 96366 THER/PROPH/DIAG IV INF ADDON: CPT

## 2021-04-05 PROCEDURE — 74011250636 HC RX REV CODE- 250/636: Performed by: INTERNAL MEDICINE

## 2021-04-05 RX ORDER — MAGNESIUM SULFATE HEPTAHYDRATE 40 MG/ML
2 INJECTION, SOLUTION INTRAVENOUS ONCE
Status: COMPLETED | OUTPATIENT
Start: 2021-04-05 | End: 2021-04-05

## 2021-04-05 RX ADMIN — MAGNESIUM SULFATE HEPTAHYDRATE 2 G: 40 INJECTION, SOLUTION INTRAVENOUS at 10:30

## 2021-04-05 NOTE — PROGRESS NOTES
[facility] Progress Note                   Date: 2021    Name: Salazar Villarreal    MRN: [id number]    : 1945    Magnesium 2GM    Ms. Leonard to [facility], ambulatory at 1010 accompanied by self. Pt was accessed and education was provided. Pt denies any pain or discomfort at this time. Pt states she received 2nd dose of  Covid 19 vaccine. Immunization updated. Ms. Sharon Raymundo vitals were reviewed and patient was observed for 5 minutes prior to treatment. Visit Vitals  /66 (BP 1 Location: Left arm, BP Patient Position: Sitting)   Pulse (!) 53   Temp 98.1 °F (36.7 °C)   Resp 16   SpO2 99%   Breastfeeding No       24g IV inserted in patient's Left antecubital left, condition patent and no redness x one attempt. Postive for blood return and flushes without difficulty with 10 ml NS. Magnesium Sulfate 2GM/50ml initiated at 25 ml/hr to be infused over 2 hours as ordered. Ms. Torres Fermin tolerated the infusion, and had no complaints. Patients armband removed and shredded. Ms. Torres Fermin was discharged from [place] [address] in stable condition at 1235. She is to follow up with referring physician as needed.        Ca Walker RN  2021  1:16 PM

## 2021-07-12 ENCOUNTER — TRANSCRIBE ORDER (OUTPATIENT)
Dept: SCHEDULING | Age: 76
End: 2021-07-12

## 2021-07-12 DIAGNOSIS — Z12.31 VISIT FOR SCREENING MAMMOGRAM: Primary | ICD-10-CM

## 2021-07-30 ENCOUNTER — HOSPITAL ENCOUNTER (OUTPATIENT)
Dept: WOMENS IMAGING | Age: 76
Discharge: HOME | End: 2021-07-30
Attending: INTERNAL MEDICINE
Payer: MEDICARE

## 2021-07-30 DIAGNOSIS — Z12.31 VISIT FOR SCREENING MAMMOGRAM: ICD-10-CM

## 2021-07-30 PROCEDURE — 77063 BREAST TOMOSYNTHESIS BI: CPT

## 2021-11-05 PROBLEM — E83.42 HYPOMAGNESEMIA: Status: ACTIVE | Noted: 2021-11-05

## 2021-11-05 RX ORDER — SODIUM CHLORIDE 9 MG/ML
10 INJECTION INTRAMUSCULAR; INTRAVENOUS; SUBCUTANEOUS AS NEEDED
Status: CANCELLED | OUTPATIENT
Start: 2021-11-09

## 2021-11-05 RX ORDER — HYDROCORTISONE SODIUM SUCCINATE 100 MG/2ML
100 INJECTION, POWDER, FOR SOLUTION INTRAMUSCULAR; INTRAVENOUS AS NEEDED
Status: CANCELLED | OUTPATIENT
Start: 2021-11-09

## 2021-11-05 RX ORDER — EPINEPHRINE 1 MG/ML
0.3 INJECTION, SOLUTION, CONCENTRATE INTRAVENOUS AS NEEDED
Status: CANCELLED | OUTPATIENT
Start: 2021-11-09

## 2021-11-05 RX ORDER — SODIUM CHLORIDE 0.9 % (FLUSH) 0.9 %
10 SYRINGE (ML) INJECTION AS NEEDED
Status: CANCELLED | OUTPATIENT
Start: 2021-11-09

## 2021-11-05 RX ORDER — ACETAMINOPHEN 325 MG/1
650 TABLET ORAL AS NEEDED
Status: CANCELLED
Start: 2021-11-09

## 2021-11-05 RX ORDER — DIPHENHYDRAMINE HYDROCHLORIDE 50 MG/ML
25 INJECTION, SOLUTION INTRAMUSCULAR; INTRAVENOUS AS NEEDED
Status: CANCELLED
Start: 2021-11-09

## 2021-11-05 RX ORDER — ONDANSETRON 2 MG/ML
8 INJECTION INTRAMUSCULAR; INTRAVENOUS AS NEEDED
Status: CANCELLED | OUTPATIENT
Start: 2021-11-09

## 2021-11-05 RX ORDER — DIPHENHYDRAMINE HYDROCHLORIDE 50 MG/ML
50 INJECTION, SOLUTION INTRAMUSCULAR; INTRAVENOUS AS NEEDED
Status: CANCELLED
Start: 2021-11-09

## 2021-11-05 RX ORDER — HEPARIN 100 UNIT/ML
300-500 SYRINGE INTRAVENOUS AS NEEDED
Status: CANCELLED
Start: 2021-11-09

## 2021-11-05 RX ORDER — ALBUTEROL SULFATE 0.83 MG/ML
2.5 SOLUTION RESPIRATORY (INHALATION) AS NEEDED
Status: CANCELLED
Start: 2021-11-09

## 2021-11-09 ENCOUNTER — HOSPITAL ENCOUNTER (OUTPATIENT)
Dept: INFUSION THERAPY | Age: 76
Discharge: HOME | End: 2021-11-09
Payer: MEDICARE

## 2021-11-09 VITALS
TEMPERATURE: 96.5 F | SYSTOLIC BLOOD PRESSURE: 142 MMHG | RESPIRATION RATE: 18 BRPM | HEART RATE: 55 BPM | OXYGEN SATURATION: 98 % | DIASTOLIC BLOOD PRESSURE: 60 MMHG

## 2021-11-09 DIAGNOSIS — E83.42 HYPOMAGNESEMIA: Primary | ICD-10-CM

## 2021-11-09 PROCEDURE — 96365 THER/PROPH/DIAG IV INF INIT: CPT

## 2021-11-09 PROCEDURE — 74011250636 HC RX REV CODE- 250/636: Performed by: INTERNAL MEDICINE

## 2021-11-09 PROCEDURE — 96366 THER/PROPH/DIAG IV INF ADDON: CPT

## 2021-11-09 RX ORDER — EPINEPHRINE 1 MG/ML
0.3 INJECTION, SOLUTION, CONCENTRATE INTRAVENOUS AS NEEDED
Status: CANCELLED | OUTPATIENT
Start: 2021-11-09

## 2021-11-09 RX ORDER — ALBUTEROL SULFATE 0.83 MG/ML
2.5 SOLUTION RESPIRATORY (INHALATION) AS NEEDED
Status: CANCELLED
Start: 2021-11-09

## 2021-11-09 RX ORDER — SODIUM CHLORIDE 9 MG/ML
10 INJECTION INTRAMUSCULAR; INTRAVENOUS; SUBCUTANEOUS AS NEEDED
Status: CANCELLED | OUTPATIENT
Start: 2021-11-09

## 2021-11-09 RX ORDER — DIPHENHYDRAMINE HYDROCHLORIDE 50 MG/ML
25 INJECTION, SOLUTION INTRAMUSCULAR; INTRAVENOUS AS NEEDED
Status: CANCELLED
Start: 2021-11-09

## 2021-11-09 RX ORDER — MAGNESIUM SULFATE HEPTAHYDRATE 40 MG/ML
2 INJECTION, SOLUTION INTRAVENOUS ONCE
Status: COMPLETED | OUTPATIENT
Start: 2021-11-09 | End: 2021-11-09

## 2021-11-09 RX ORDER — SODIUM CHLORIDE 0.9 % (FLUSH) 0.9 %
10 SYRINGE (ML) INJECTION AS NEEDED
Status: CANCELLED | OUTPATIENT
Start: 2021-11-09

## 2021-11-09 RX ORDER — HYDROCORTISONE SODIUM SUCCINATE 100 MG/2ML
100 INJECTION, POWDER, FOR SOLUTION INTRAMUSCULAR; INTRAVENOUS AS NEEDED
Status: CANCELLED | OUTPATIENT
Start: 2021-11-09

## 2021-11-09 RX ORDER — DIPHENHYDRAMINE HYDROCHLORIDE 50 MG/ML
50 INJECTION, SOLUTION INTRAMUSCULAR; INTRAVENOUS AS NEEDED
Status: CANCELLED
Start: 2021-11-09

## 2021-11-09 RX ORDER — SODIUM CHLORIDE 0.9 % (FLUSH) 0.9 %
5-10 SYRINGE (ML) INJECTION AS NEEDED
Status: DISCONTINUED | OUTPATIENT
Start: 2021-11-09 | End: 2021-11-11 | Stop reason: HOSPADM

## 2021-11-09 RX ORDER — MAGNESIUM SULFATE HEPTAHYDRATE 40 MG/ML
2 INJECTION, SOLUTION INTRAVENOUS ONCE
Status: CANCELLED
Start: 2021-11-09 | End: 2021-11-09

## 2021-11-09 RX ORDER — HEPARIN 100 UNIT/ML
300-500 SYRINGE INTRAVENOUS AS NEEDED
Status: CANCELLED
Start: 2021-11-09

## 2021-11-09 RX ORDER — ONDANSETRON 2 MG/ML
8 INJECTION INTRAMUSCULAR; INTRAVENOUS AS NEEDED
Status: CANCELLED | OUTPATIENT
Start: 2021-11-09

## 2021-11-09 RX ORDER — ACETAMINOPHEN 325 MG/1
650 TABLET ORAL AS NEEDED
Status: CANCELLED
Start: 2021-11-09

## 2021-11-09 RX ADMIN — MAGNESIUM SULFATE HEPTAHYDRATE 2 G: 40 INJECTION, SOLUTION INTRAVENOUS at 09:26

## 2021-11-09 RX ADMIN — Medication 10 ML: at 11:23

## 2021-11-09 NOTE — PROGRESS NOTES
Pharmacy Note     Name: Timothy Person  : 1945  Estimated body surface area is 1.67 meters squared as calculated from the following:    Height as of 19: 154.9 cm (61\"). Weight as of 19: 64.9 kg (143 lb). Treatment Plan: Magnesium 2 g IVPB  Start Date: 2021    Lab Results   Component Value Date/Time    WBC 8.7 05/15/2020 01:15 PM    PLATELET 273  01:15 PM     Lab Results   Component Value Date/Time    ABS.  NEUTROPHILS 5.6 05/15/2020 01:15 PM     Lab Results   Component Value Date/Time    Creatinine, POC 1.7 (H) 2020 04:28 PM    Creatinine 1.32 (H) 05/15/2020 01:15 PM     Most Recent Creatinine Clearance:  CrCl: 0.0 mL/min (based on Adjusted Body Weight)  Creatinine: 1.32 mg/dL (5/15/2020)      Pharmacy Intervention:  Spoke to office staff with [facility] to inform them that Magnesium 2 g IVPB was given in 50 mL NS since pharmacy has pre-made bags available in medication dispensing machine on-site  Order was written to give Magnesium 2 g IV in 100 mL NS  On behalf of Dr. Ashkan Lo, nurse staff confirmed it was acceptable to give magnesium 2 g in 50 mL NS IVPB once     Thank you,  Claudia Burk, PharmD, [address] [place] [address]   [phone]       Pharmacist: JOSELYN Poole

## 2021-11-09 NOTE — PROGRESS NOTES
[facility] Progress Note    Date: 2021    Name: Kenroy Laguerre    MRN: [id number]         : 1945     Magnesium Infusion. Ms. Ajay Chase to [facility], ambulatory at 7446. Pt was assessed and education was provided. Ms. Vinita Lao vitals were reviewed and patient was observed for 5 minutes prior to treatment. Visit Vitals  BP (!) 142/60 (BP 1 Location: Right upper arm, BP Patient Position: Sitting)   Pulse (!) 55   Temp (!) 96.5 °F (35.8 °C)   Resp 18   SpO2 98%   Breastfeeding No     24g PIV started to left ac x 1 attempt. Flushes easily with brisk blood return. 2gram Magnesium sulfate in 50ml of ns was administered over 2 hours as ordered. Ms. Ajay Chase tolerated well, and had no complaints. Patient armband removed and shredded. PIV flushed and removed. Gauze and coban applied to site. No swelling, redness or drainage noted. Ms. Ajay Chase was discharged from [place] [address] in stable condition at 1125. She has no future [facility] appointments at this time.       Farida Hernandez RN  2021  12:43 PM

## 2021-11-11 ENCOUNTER — HOSPITAL ENCOUNTER (OUTPATIENT)
Dept: BONE DENSITY | Age: 76
Discharge: HOME | End: 2021-11-11
Attending: INTERNAL MEDICINE
Payer: MEDICARE

## 2021-11-11 DIAGNOSIS — M81.0 POSTMENOPAUSAL OSTEOPOROSIS: ICD-10-CM

## 2021-11-11 DIAGNOSIS — Z78.0 POSTMENOPAUSAL: ICD-10-CM

## 2021-11-11 PROCEDURE — 77080 DXA BONE DENSITY AXIAL: CPT

## 2021-12-13 ENCOUNTER — HOSPITAL ENCOUNTER (OUTPATIENT)
Dept: INFUSION THERAPY | Age: 76
Discharge: HOME | End: 2021-12-13
Payer: MEDICARE

## 2021-12-13 VITALS
RESPIRATION RATE: 18 BRPM | OXYGEN SATURATION: 98 % | TEMPERATURE: 98.1 F | HEART RATE: 53 BPM | SYSTOLIC BLOOD PRESSURE: 143 MMHG | DIASTOLIC BLOOD PRESSURE: 63 MMHG

## 2021-12-13 DIAGNOSIS — E83.42 HYPOMAGNESEMIA: ICD-10-CM

## 2021-12-13 PROCEDURE — 96365 THER/PROPH/DIAG IV INF INIT: CPT

## 2021-12-13 PROCEDURE — 74011250636 HC RX REV CODE- 250/636: Performed by: INTERNAL MEDICINE

## 2021-12-13 PROCEDURE — 96366 THER/PROPH/DIAG IV INF ADDON: CPT

## 2021-12-13 RX ORDER — MAGNESIUM SULFATE HEPTAHYDRATE 40 MG/ML
2 INJECTION, SOLUTION INTRAVENOUS ONCE
Status: COMPLETED | OUTPATIENT
Start: 2021-12-13 | End: 2021-12-13

## 2021-12-13 RX ADMIN — MAGNESIUM SULFATE HEPTAHYDRATE 2 G: 40 INJECTION, SOLUTION INTRAVENOUS at 08:20

## 2021-12-13 NOTE — PROGRESS NOTES
[facility] Progress Note                   Date: 2021    Name: Saadia Zacarias    MRN: [id number]    : 1945    Magnesium 2GM    Ms. Leonard to [facility], ambulatory at [phone] accompanied by self. Pt was accessed and education was provided. Pt denies any pain or discomfort at this time. Ms. Vidal Kanner vitals were reviewed and patient was observed for 5 minutes prior to treatment. Visit Vitals  BP (!) 143/63 (BP 1 Location: Left arm, BP Patient Position: Sitting)   Pulse (!) 53   Temp 98.1 °F (36.7 °C)   Resp 18   SpO2 98%   Breastfeeding No       24g IV inserted in patient's Left antecubital left, condition patent and no redness x one attempt. Postive for blood return and flushes without difficulty with 10 ml NS. Magnesium Sulfate 2GM/50ml initiated at 25 ml/hr to be infused over 2 hours as ordered. Ms. Nemesio Gomes tolerated the infusion, and had no complaints. Patients armband removed and shredded. Ms. Nemesio Gomes was discharged from [place] [address] in stable condition at 1020. She is to follow up with referring physician as needed.        Brody Brown RN  2021

## 2022-03-08 ENCOUNTER — HOSPITAL ENCOUNTER (OUTPATIENT)
Dept: INFUSION THERAPY | Age: 77
Discharge: HOME | End: 2022-03-08
Payer: MEDICARE

## 2022-03-08 VITALS
TEMPERATURE: 97 F | RESPIRATION RATE: 18 BRPM | DIASTOLIC BLOOD PRESSURE: 75 MMHG | OXYGEN SATURATION: 99 % | SYSTOLIC BLOOD PRESSURE: 157 MMHG | HEART RATE: 63 BPM

## 2022-03-08 PROCEDURE — 96366 THER/PROPH/DIAG IV INF ADDON: CPT

## 2022-03-08 PROCEDURE — 74011250636 HC RX REV CODE- 250/636: Performed by: INTERNAL MEDICINE

## 2022-03-08 PROCEDURE — 96365 THER/PROPH/DIAG IV INF INIT: CPT

## 2022-03-08 RX ORDER — MAGNESIUM SULFATE HEPTAHYDRATE 40 MG/ML
2 INJECTION, SOLUTION INTRAVENOUS ONCE
Status: COMPLETED | OUTPATIENT
Start: 2022-03-08 | End: 2022-03-08

## 2022-03-08 RX ADMIN — MAGNESIUM SULFATE HEPTAHYDRATE 2 G: 40 INJECTION, SOLUTION INTRAVENOUS at 09:23

## 2022-03-08 NOTE — PROGRESS NOTES
[facility] Progress Note    Date: 2022    Name: Cleo Figueroa    MRN: [id number]         : 1945    Magnesium    Ms. Randy Councilman was assessed and education was provided. Ms. Ralph Womack vitals were reviewed and patient was observed for 5 minutes prior to treatment. Visit Vitals  BP (!) 157/75 (BP 1 Location: Left arm, BP Patient Position: Sitting)   Pulse 63   Temp 97 °F (36.1 °C)   Resp 18   SpO2 99%   Breastfeeding No       Lab results were obtained and reviewed. No results found for this or any previous visit (from the past 12 hour(s)). Saline lock started to Left arm x1 attempt using 24g catheter, line flushes briskly. Magnesium 2gm was infused over 2 hours, infused at 25ml/hr. After infusion complete line flushed with normal saline then removed. Gauze and coban applied to site. Ms. Randy Councilman tolerated the infusion, and had no complaints. Patient armband removed and shredded. Ms. Randy Councilman was discharged from [place] [address] in stable condition at 1120. She is to follow up with physician for her next appointment.     Anum Javed RN  2022

## 2022-03-19 PROBLEM — G70.00 MYASTHENIA GRAVIS (HCC): Status: ACTIVE | Noted: 2018-08-27

## 2022-03-19 PROBLEM — I10 HTN (HYPERTENSION): Status: ACTIVE | Noted: 2018-08-27

## 2022-03-19 PROBLEM — E83.42 HYPOMAGNESEMIA: Status: ACTIVE | Noted: 2021-11-05

## 2022-03-19 PROBLEM — S62.619A FRACTURE OF PROXIMAL PHALANX OF LEFT HAND: Status: ACTIVE | Noted: 2018-08-27

## 2022-03-19 PROBLEM — S43.004A SHOULDER DISLOCATION, RIGHT, INITIAL ENCOUNTER: Status: ACTIVE | Noted: 2018-08-27

## 2022-03-19 PROBLEM — S42.91XA SHOULDER FRACTURE, RIGHT, CLOSED, INITIAL ENCOUNTER: Status: ACTIVE | Noted: 2018-08-25

## 2022-04-06 ENCOUNTER — HOSPITAL ENCOUNTER (OUTPATIENT)
Dept: INFUSION THERAPY | Age: 77
Discharge: HOME | End: 2022-04-06
Payer: MEDICARE

## 2022-04-06 VITALS
RESPIRATION RATE: 18 BRPM | DIASTOLIC BLOOD PRESSURE: 61 MMHG | TEMPERATURE: 97.7 F | SYSTOLIC BLOOD PRESSURE: 147 MMHG | OXYGEN SATURATION: 99 % | HEART RATE: 56 BPM

## 2022-04-06 PROCEDURE — 96365 THER/PROPH/DIAG IV INF INIT: CPT

## 2022-04-06 PROCEDURE — 74011250636 HC RX REV CODE- 250/636: Performed by: INTERNAL MEDICINE

## 2022-04-06 PROCEDURE — 96366 THER/PROPH/DIAG IV INF ADDON: CPT

## 2022-04-06 PROCEDURE — 74011000250 HC RX REV CODE- 250: Performed by: INTERNAL MEDICINE

## 2022-04-06 RX ORDER — MAGNESIUM SULFATE HEPTAHYDRATE 40 MG/ML
2 INJECTION, SOLUTION INTRAVENOUS ONCE
Status: COMPLETED | OUTPATIENT
Start: 2022-04-06 | End: 2022-04-06

## 2022-04-06 RX ORDER — SODIUM CHLORIDE 0.9 % (FLUSH) 0.9 %
5-10 SYRINGE (ML) INJECTION AS NEEDED
Status: DISCONTINUED | OUTPATIENT
Start: 2022-04-06 | End: 2022-04-08 | Stop reason: HOSPADM

## 2022-04-06 RX ADMIN — SODIUM CHLORIDE, PRESERVATIVE FREE 10 ML: 5 INJECTION INTRAVENOUS at 09:24

## 2022-04-06 RX ADMIN — MAGNESIUM SULFATE HEPTAHYDRATE 2 G: 40 INJECTION, SOLUTION INTRAVENOUS at 09:26

## 2022-04-06 RX ADMIN — SODIUM CHLORIDE, PRESERVATIVE FREE 10 ML: 5 INJECTION INTRAVENOUS at 11:27

## 2022-04-06 NOTE — PROGRESS NOTES
[address] [facility] Progress Note    Date: 2022    Name: Catalina Walden    MRN: [id number]         : 1945    Magnesium (2grams) Infusion    Ms. Leonard to [address] [place] [address], [facility], at 0900. Pt was assessed and education was provided. Pt denies any complaints at this time. Ms. Ron Rodríguez vitals were reviewed and patient was observed for 5 minutes prior to treatment. Visit Vitals  BP (!) 141/61   Pulse (!) 49   Temp 97.7 °F (36.5 °C)   Resp 18   SpO2 99%   Breastfeeding No       24g PIV placed in right antecubital x 1 attempt. PIV flushed easily and had brisk blood return. Magnesium 1.4 per scanned in labs from 3/31/22. Magnesium sulfate 2 gram/50ml NS infused over 2 hours as ordered. Ms. Yuko Hernandez tolerated the infusion, and had no complaints. VS remained stable. Patient Vitals for the past 12 hrs:   Temp Pulse Resp BP SpO2   22 1124  (!) 56 18 (!) 147/61 99 %   22 0913 97.7 °F (36.5 °C) (!) 49 18 (!) 141/61 99 %       PIV flushed with NS 10 ml and removed. No bleeding or hematoma noted at site. Gauze and coban applied. Patient armband removed and shredded. Ms. Yuko Hernandez was discharged from [place] [address] in stable condition at 1130. She is to follow up with Dr. Kb Talley as needed.     Anika Acosta RN  2022  11:16 AM

## 2022-06-03 NOTE — PROGRESS NOTES
[facility] Progress Note    Date: 2020    Name: Natalie Vela    MRN: [id number]         : 1945    Magnesium Sulfate Infusion    Ms. Hanna Valenzuela to [facility], ambulatory, at [phone]. Pt was assessed and education was provided. Ms. Tiana Ramsey vitals were reviewed and patient was observed for 5 minutes prior to treatment. Patient endorses taking her meds this morning, prior to arrival.    Visit Vitals  /54 (BP 1 Location: Left arm, BP Patient Position: Sitting)   Pulse (!) 49   Temp 97.4 °F (36.3 °C)   Resp 16   SpO2 100%   Breastfeeding No         22 g PIV placed in antecubital vein of left forearm x 1 attempt. PIV flushed easily and had brisk blood return. First of 2 doses of Magnesium sulfate 2GM IV was initiated @ 25 mL/hr, to infuse over 2 hours, as ordered, via peripheral line. Patient tolerated infusion well and without complaints. IV site benign. V/s stable. Second dose of 2 of Magnesium sulfate 2 GM IV infused over 2 hours @ 25 mL/hr. Ms. Hanna Valenzuela tolerated infusion well, and had no complaints. VS remained stable. IV site without evidence of irritation or infiltration. PIV was flushed with NS 10 mL and catheter removed with tip intact. No bleeding or hematoma noted at site. Gauze and coban applied. Patient armband removed and shredded. Ms. Hanna Valenzuela was discharged from [place] [address] in stable condition at 1240. She is to follow up with her physician as needed.     Tyler Reed RN  2020  9:43 AM Postop Diagnosis: same

## 2022-07-21 ENCOUNTER — HOSPITAL ENCOUNTER (OUTPATIENT)
Dept: INFUSION THERAPY | Age: 77
Discharge: HOME | End: 2022-07-21
Payer: MEDICARE

## 2022-07-21 VITALS
OXYGEN SATURATION: 100 % | DIASTOLIC BLOOD PRESSURE: 63 MMHG | HEART RATE: 60 BPM | TEMPERATURE: 97.6 F | RESPIRATION RATE: 16 BRPM | SYSTOLIC BLOOD PRESSURE: 146 MMHG

## 2022-07-21 PROCEDURE — 96366 THER/PROPH/DIAG IV INF ADDON: CPT

## 2022-07-21 PROCEDURE — 96365 THER/PROPH/DIAG IV INF INIT: CPT

## 2022-07-21 PROCEDURE — 74011000250 HC RX REV CODE- 250: Performed by: INTERNAL MEDICINE

## 2022-07-21 PROCEDURE — 74011250636 HC RX REV CODE- 250/636: Performed by: INTERNAL MEDICINE

## 2022-07-21 RX ORDER — MAGNESIUM SULFATE HEPTAHYDRATE 40 MG/ML
2 INJECTION, SOLUTION INTRAVENOUS ONCE
Status: COMPLETED | OUTPATIENT
Start: 2022-07-21 | End: 2022-07-21

## 2022-07-21 RX ORDER — SODIUM CHLORIDE 0.9 % (FLUSH) 0.9 %
10-40 SYRINGE (ML) INJECTION AS NEEDED
Status: DISCONTINUED | OUTPATIENT
Start: 2022-07-21 | End: 2022-07-23 | Stop reason: HOSPADM

## 2022-07-21 RX ADMIN — SODIUM CHLORIDE, PRESERVATIVE FREE 10 ML: 5 INJECTION INTRAVENOUS at 12:28

## 2022-07-21 RX ADMIN — SODIUM CHLORIDE, PRESERVATIVE FREE 10 ML: 5 INJECTION INTRAVENOUS at 10:32

## 2022-07-21 RX ADMIN — MAGNESIUM SULFATE HEPTAHYDRATE 2 G: 40 INJECTION, SOLUTION INTRAVENOUS at 10:35

## 2022-07-21 NOTE — PROGRESS NOTES
[facility] Progress Note    Date: 2022    Name: Hira Virk    MRN: [id number]         : 1945    Magnesium (2 grams) Infusion    Ms. Jace Ribeiro to [facility], ambulatory, at 1683. Pt was assessed and education was provided. Pt denied pain. Ms. Nathan Gan vitals were reviewed and patient was observed for 5 minutes prior to treatment. Visit Vitals  BP (!) 161/66 (BP 1 Location: Right upper arm, BP Patient Position: Sitting)   Pulse 71   Temp 97.1 °F (36.2 °C)   Resp 18   SpO2 97%   Breastfeeding No       24 g PIV placed in R AC x 2 attempt, by Anjel Hadley RN. PIV flushed easily and had brisk blood return. Magnesium sulfate 2 gram/50ml NS infused over 2 hours as ordered. Ms. Jace Ribeiro tolerated the infusion, and had no complaints. VS remained stable. PIV flushed with NS 10 ml and removed. No bleeding or hematoma noted at site. Gauze and coban applied. Patient Vitals for the past 12 hrs:   Temp Pulse Resp BP SpO2   22 1229 97.6 °F (36.4 °C) 60 16 (!) 146/63 100 %   22 1000 97.1 °F (36.2 °C) 71 18 (!) 161/66 97 %     Patient armband removed and shredded. Ms. Jace Ribeiro was discharged from [place] [address] in stable condition at 1235. She is to follow up with MD Marcelina Zhang as needed.     Su So RN  2022  10:48 AM

## 2022-10-05 ENCOUNTER — TRANSCRIBE ORDER (OUTPATIENT)
Dept: SCHEDULING | Age: 77
End: 2022-10-05

## 2022-10-05 DIAGNOSIS — Z12.31 VISIT FOR SCREENING MAMMOGRAM: Primary | ICD-10-CM

## 2022-10-07 ENCOUNTER — HOSPITAL ENCOUNTER (OUTPATIENT)
Dept: INFUSION THERAPY | Age: 77
Discharge: HOME | End: 2022-10-07
Payer: MEDICARE

## 2022-10-07 VITALS
TEMPERATURE: 97.4 F | SYSTOLIC BLOOD PRESSURE: 138 MMHG | DIASTOLIC BLOOD PRESSURE: 57 MMHG | HEART RATE: 51 BPM | RESPIRATION RATE: 16 BRPM | OXYGEN SATURATION: 98 %

## 2022-10-07 DIAGNOSIS — E83.42 HYPOMAGNESEMIA: Primary | ICD-10-CM

## 2022-10-07 PROCEDURE — 96366 THER/PROPH/DIAG IV INF ADDON: CPT

## 2022-10-07 PROCEDURE — 96365 THER/PROPH/DIAG IV INF INIT: CPT

## 2022-10-07 PROCEDURE — 74011250636 HC RX REV CODE- 250/636: Performed by: INTERNAL MEDICINE

## 2022-10-07 PROCEDURE — 74011000250 HC RX REV CODE- 250: Performed by: INTERNAL MEDICINE

## 2022-10-07 RX ORDER — MAGNESIUM SULFATE HEPTAHYDRATE 40 MG/ML
2 INJECTION, SOLUTION INTRAVENOUS ONCE
Status: COMPLETED | OUTPATIENT
Start: 2022-10-07 | End: 2022-10-07

## 2022-10-07 RX ORDER — SODIUM CHLORIDE 0.9 % (FLUSH) 0.9 %
10-40 SYRINGE (ML) INJECTION AS NEEDED
Status: DISCONTINUED | OUTPATIENT
Start: 2022-10-07 | End: 2022-10-09 | Stop reason: HOSPADM

## 2022-10-07 RX ADMIN — SODIUM CHLORIDE, PRESERVATIVE FREE 10 ML: 5 INJECTION INTRAVENOUS at 08:34

## 2022-10-07 RX ADMIN — SODIUM CHLORIDE, PRESERVATIVE FREE 10 ML: 5 INJECTION INTRAVENOUS at 10:21

## 2022-10-07 RX ADMIN — MAGNESIUM SULFATE HEPTAHYDRATE 2 G: 40 INJECTION, SOLUTION INTRAVENOUS at 08:33

## 2022-10-07 NOTE — PROGRESS NOTES
[facility] Progress Note    Date: 2022    Name: Narciso Leblanc    MRN: [id number]         : 1945    Magnesium    Ms. Jessica Rodriguez was assessed and education was provided. Ms. Farhan Armenta vitals were reviewed and patient was observed for 5 minutes prior to treatment. Visit Vitals  BP (!) 138/57   Pulse (!) 51   Temp 97.4 °F (36.3 °C)   Resp 16   SpO2 98%   Breastfeeding No       PIV #22 started in left AC x1 attempt; brisk blood return and flushed with ease. Magnesium 2gm was infused over 2 hours, infused at 25ml/hr. After infusion complete line flushed with normal saline then removed. Gauze and coban applied to site. Ms. Jessica Rodriguez tolerated the infusion, and had no complaints. Patient armband removed and shredded. Ms. Jessica Rodriguez was discharged from [place] [address] in stable condition at 1025. She is to follow up with physician for her next appointment.     Trisha Aguirre RN  2022

## 2022-11-16 ENCOUNTER — HOSPITAL ENCOUNTER (OUTPATIENT)
Dept: INFUSION THERAPY | Age: 77
Discharge: HOME | End: 2022-11-16
Payer: MEDICARE

## 2022-11-16 VITALS
SYSTOLIC BLOOD PRESSURE: 131 MMHG | HEART RATE: 61 BPM | DIASTOLIC BLOOD PRESSURE: 69 MMHG | TEMPERATURE: 96.8 F | RESPIRATION RATE: 18 BRPM | OXYGEN SATURATION: 95 %

## 2022-11-16 PROCEDURE — 74011250636 HC RX REV CODE- 250/636: Performed by: INTERNAL MEDICINE

## 2022-11-16 PROCEDURE — 96366 THER/PROPH/DIAG IV INF ADDON: CPT

## 2022-11-16 PROCEDURE — 96365 THER/PROPH/DIAG IV INF INIT: CPT

## 2022-11-16 PROCEDURE — 74011000250 HC RX REV CODE- 250: Performed by: INTERNAL MEDICINE

## 2022-11-16 RX ORDER — MAGNESIUM SULFATE HEPTAHYDRATE 40 MG/ML
2 INJECTION, SOLUTION INTRAVENOUS ONCE
Status: COMPLETED | OUTPATIENT
Start: 2022-11-16 | End: 2022-11-16

## 2022-11-16 RX ORDER — SODIUM CHLORIDE 0.9 % (FLUSH) 0.9 %
5-10 SYRINGE (ML) INJECTION AS NEEDED
Status: DISCONTINUED | OUTPATIENT
Start: 2022-11-16 | End: 2022-11-18 | Stop reason: HOSPADM

## 2022-11-16 RX ADMIN — MAGNESIUM SULFATE HEPTAHYDRATE 2 G: 40 INJECTION, SOLUTION INTRAVENOUS at 11:15

## 2022-11-16 RX ADMIN — SODIUM CHLORIDE, PRESERVATIVE FREE 10 ML: 5 INJECTION INTRAVENOUS at 13:15

## 2022-11-16 RX ADMIN — SODIUM CHLORIDE, PRESERVATIVE FREE 10 ML: 5 INJECTION INTRAVENOUS at 11:11

## 2022-11-16 NOTE — PROGRESS NOTES
[facility] Progress Note    Date: 2022    Name: Saira Amaro    MRN: [id number]         : 1945    Magnesium 2g  Ms. Al Philip was assessed and education was provided. Ms. Alejandra Anderson vitals were reviewed and patient was observed for 5 minutes prior to treatment. Visit Vitals  BP (!) 176/63 (BP 1 Location: Left upper arm, BP Patient Position: Sitting)   Pulse (!) 53   Temp 96.8 °F (36 °C)   Resp 18   SpO2 98%   Breastfeeding No     24g PIV was inserted to the [facility] [place] x2 attempts, brisk blood return and flushed easily. Magnesium Sulfate 2g was infused over 2 hours. Ms. Al Philip tolerated the infusion, and had no complaints. Patient armband removed and shredded. Ms. Al Philip was discharged from [place] [address] in stable condition at 1320. She is to follow up with Dr. Farhat Zarco as needed.      Collette Schumacher RN  2022  11:27 AM

## 2022-12-16 ENCOUNTER — TRANSCRIBE ORDER (OUTPATIENT)
Dept: SCHEDULING | Age: 77
End: 2022-12-16

## 2022-12-16 DIAGNOSIS — Z12.31 VISIT FOR SCREENING MAMMOGRAM: Primary | ICD-10-CM

## 2022-12-19 ENCOUNTER — HOSPITAL ENCOUNTER (OUTPATIENT)
Dept: WOMENS IMAGING | Age: 77
Discharge: HOME | End: 2022-12-19
Attending: INTERNAL MEDICINE
Payer: MEDICARE

## 2022-12-19 DIAGNOSIS — Z12.31 VISIT FOR SCREENING MAMMOGRAM: ICD-10-CM

## 2022-12-19 PROCEDURE — 77063 BREAST TOMOSYNTHESIS BI: CPT

## 2023-01-31 DIAGNOSIS — Z12.31 VISIT FOR SCREENING MAMMOGRAM: Primary | ICD-10-CM

## 2023-02-04 DIAGNOSIS — Z12.31 VISIT FOR SCREENING MAMMOGRAM: Primary | ICD-10-CM

## 2023-08-28 RX ORDER — ONDANSETRON 2 MG/ML
8 INJECTION INTRAMUSCULAR; INTRAVENOUS
Status: CANCELLED | OUTPATIENT
Start: 2023-08-29

## 2023-08-28 RX ORDER — ALBUTEROL SULFATE 90 UG/1
4 AEROSOL, METERED RESPIRATORY (INHALATION) PRN
Status: CANCELLED | OUTPATIENT
Start: 2023-08-29

## 2023-08-28 RX ORDER — HEPARIN 100 UNIT/ML
500 SYRINGE INTRAVENOUS PRN
Status: CANCELLED | OUTPATIENT
Start: 2023-08-29

## 2023-08-28 RX ORDER — DIPHENHYDRAMINE HYDROCHLORIDE 50 MG/ML
50 INJECTION INTRAMUSCULAR; INTRAVENOUS
Status: CANCELLED | OUTPATIENT
Start: 2023-08-29

## 2023-08-28 RX ORDER — EPINEPHRINE 1 MG/ML
0.3 INJECTION, SOLUTION, CONCENTRATE INTRAVENOUS PRN
Status: CANCELLED | OUTPATIENT
Start: 2023-08-29

## 2023-08-28 RX ORDER — SODIUM CHLORIDE 9 MG/ML
INJECTION, SOLUTION INTRAVENOUS CONTINUOUS
Status: CANCELLED | OUTPATIENT
Start: 2023-08-29

## 2023-08-28 RX ORDER — ACETAMINOPHEN 325 MG/1
650 TABLET ORAL
Status: CANCELLED | OUTPATIENT
Start: 2023-08-29

## 2023-08-29 ENCOUNTER — HOSPITAL ENCOUNTER (OUTPATIENT)
Facility: HOSPITAL | Age: 78
Setting detail: INFUSION SERIES
End: 2023-08-29
Payer: MEDICARE

## 2023-08-29 VITALS
TEMPERATURE: 97.1 F | OXYGEN SATURATION: 97 % | SYSTOLIC BLOOD PRESSURE: 128 MMHG | DIASTOLIC BLOOD PRESSURE: 67 MMHG | RESPIRATION RATE: 16 BRPM | HEART RATE: 69 BPM

## 2023-08-29 DIAGNOSIS — E83.42 HYPOMAGNESEMIA: Primary | ICD-10-CM

## 2023-08-29 PROCEDURE — 2580000003 HC RX 258: Performed by: INTERNAL MEDICINE

## 2023-08-29 PROCEDURE — 96366 THER/PROPH/DIAG IV INF ADDON: CPT

## 2023-08-29 PROCEDURE — 96365 THER/PROPH/DIAG IV INF INIT: CPT

## 2023-08-29 PROCEDURE — 6360000002 HC RX W HCPCS: Performed by: INTERNAL MEDICINE

## 2023-08-29 RX ORDER — MAGNESIUM SULFATE IN WATER 40 MG/ML
2000 INJECTION, SOLUTION INTRAVENOUS ONCE
Status: CANCELLED | OUTPATIENT
Start: 2023-08-29 | End: 2023-08-29

## 2023-08-29 RX ORDER — SODIUM CHLORIDE 9 MG/ML
5-250 INJECTION, SOLUTION INTRAVENOUS PRN
OUTPATIENT
Start: 2023-08-29

## 2023-08-29 RX ORDER — SODIUM CHLORIDE 9 MG/ML
5-250 INJECTION, SOLUTION INTRAVENOUS PRN
Status: ACTIVE | OUTPATIENT
Start: 2023-08-29 | End: 2023-08-30

## 2023-08-29 RX ORDER — MAGNESIUM SULFATE IN WATER 40 MG/ML
2000 INJECTION, SOLUTION INTRAVENOUS ONCE
Status: COMPLETED | OUTPATIENT
Start: 2023-08-29 | End: 2023-08-29

## 2023-08-29 RX ORDER — SODIUM CHLORIDE 9 MG/ML
INJECTION, SOLUTION INTRAVENOUS CONTINUOUS
OUTPATIENT
Start: 2023-08-29

## 2023-08-29 RX ORDER — DIPHENHYDRAMINE HYDROCHLORIDE 50 MG/ML
50 INJECTION INTRAMUSCULAR; INTRAVENOUS
OUTPATIENT
Start: 2023-08-29

## 2023-08-29 RX ORDER — SODIUM CHLORIDE 0.9 % (FLUSH) 0.9 %
5-40 SYRINGE (ML) INJECTION PRN
Status: ACTIVE | OUTPATIENT
Start: 2023-08-29 | End: 2023-08-30

## 2023-08-29 RX ORDER — ACETAMINOPHEN 325 MG/1
650 TABLET ORAL
OUTPATIENT
Start: 2023-08-29

## 2023-08-29 RX ORDER — SODIUM CHLORIDE 0.9 % (FLUSH) 0.9 %
5-40 SYRINGE (ML) INJECTION PRN
OUTPATIENT
Start: 2023-08-29

## 2023-08-29 RX ORDER — ALBUTEROL SULFATE 90 UG/1
4 AEROSOL, METERED RESPIRATORY (INHALATION) PRN
OUTPATIENT
Start: 2023-08-29

## 2023-08-29 RX ORDER — LOSARTAN POTASSIUM 100 MG/1
100 TABLET ORAL DAILY
COMMUNITY

## 2023-08-29 RX ORDER — HEPARIN 100 UNIT/ML
500 SYRINGE INTRAVENOUS PRN
OUTPATIENT
Start: 2023-08-29

## 2023-08-29 RX ORDER — ONDANSETRON 2 MG/ML
8 INJECTION INTRAMUSCULAR; INTRAVENOUS
OUTPATIENT
Start: 2023-08-29

## 2023-08-29 RX ORDER — EPINEPHRINE 1 MG/ML
0.3 INJECTION, SOLUTION, CONCENTRATE INTRAVENOUS PRN
OUTPATIENT
Start: 2023-08-29

## 2023-08-29 RX ADMIN — Medication 10 ML: at 11:56

## 2023-08-29 RX ADMIN — Medication 10 ML: at 10:06

## 2023-08-29 RX ADMIN — MAGNESIUM SULFATE HEPTAHYDRATE 2000 MG: 40 INJECTION, SOLUTION INTRAVENOUS at 10:08

## 2024-02-22 RX ORDER — MAGNESIUM SULFATE IN WATER 40 MG/ML
2000 INJECTION, SOLUTION INTRAVENOUS ONCE
Status: CANCELLED | OUTPATIENT
Start: 2024-02-29 | End: 2024-02-22

## 2024-02-29 ENCOUNTER — HOSPITAL ENCOUNTER (OUTPATIENT)
Facility: HOSPITAL | Age: 79
Setting detail: INFUSION SERIES
End: 2024-02-29
Payer: MEDICARE

## 2024-02-29 VITALS
OXYGEN SATURATION: 97 % | TEMPERATURE: 96.9 F | SYSTOLIC BLOOD PRESSURE: 173 MMHG | RESPIRATION RATE: 16 BRPM | HEART RATE: 55 BPM | DIASTOLIC BLOOD PRESSURE: 69 MMHG

## 2024-02-29 DIAGNOSIS — E83.42 HYPOMAGNESEMIA: Primary | ICD-10-CM

## 2024-02-29 PROCEDURE — 96366 THER/PROPH/DIAG IV INF ADDON: CPT

## 2024-02-29 PROCEDURE — 96365 THER/PROPH/DIAG IV INF INIT: CPT

## 2024-02-29 PROCEDURE — 2580000003 HC RX 258: Performed by: INTERNAL MEDICINE

## 2024-02-29 PROCEDURE — 6360000002 HC RX W HCPCS: Performed by: INTERNAL MEDICINE

## 2024-02-29 RX ORDER — EPINEPHRINE 1 MG/ML
0.3 INJECTION, SOLUTION, CONCENTRATE INTRAVENOUS PRN
OUTPATIENT
Start: 2024-02-29

## 2024-02-29 RX ORDER — SODIUM CHLORIDE 9 MG/ML
INJECTION, SOLUTION INTRAVENOUS CONTINUOUS
OUTPATIENT
Start: 2024-02-29

## 2024-02-29 RX ORDER — SODIUM CHLORIDE 9 MG/ML
5-250 INJECTION, SOLUTION INTRAVENOUS PRN
Status: ACTIVE | OUTPATIENT
Start: 2024-02-29 | End: 2024-03-01

## 2024-02-29 RX ORDER — SODIUM CHLORIDE 0.9 % (FLUSH) 0.9 %
5-40 SYRINGE (ML) INJECTION PRN
Status: ACTIVE | OUTPATIENT
Start: 2024-02-29 | End: 2024-03-01

## 2024-02-29 RX ORDER — DIPHENHYDRAMINE HYDROCHLORIDE 50 MG/ML
50 INJECTION INTRAMUSCULAR; INTRAVENOUS
OUTPATIENT
Start: 2024-02-29

## 2024-02-29 RX ORDER — HEPARIN 100 UNIT/ML
500 SYRINGE INTRAVENOUS PRN
OUTPATIENT
Start: 2024-02-29

## 2024-02-29 RX ORDER — SODIUM CHLORIDE 0.9 % (FLUSH) 0.9 %
5-40 SYRINGE (ML) INJECTION PRN
OUTPATIENT
Start: 2024-02-29

## 2024-02-29 RX ORDER — ONDANSETRON 2 MG/ML
8 INJECTION INTRAMUSCULAR; INTRAVENOUS
OUTPATIENT
Start: 2024-02-29

## 2024-02-29 RX ORDER — ALBUTEROL SULFATE 90 UG/1
4 AEROSOL, METERED RESPIRATORY (INHALATION) PRN
OUTPATIENT
Start: 2024-02-29

## 2024-02-29 RX ORDER — ACETAMINOPHEN 325 MG/1
650 TABLET ORAL
OUTPATIENT
Start: 2024-02-29

## 2024-02-29 RX ORDER — MAGNESIUM SULFATE IN WATER 40 MG/ML
2000 INJECTION, SOLUTION INTRAVENOUS ONCE
Status: COMPLETED | OUTPATIENT
Start: 2024-02-29 | End: 2024-02-29

## 2024-02-29 RX ORDER — SODIUM CHLORIDE 9 MG/ML
5-250 INJECTION, SOLUTION INTRAVENOUS PRN
OUTPATIENT
Start: 2024-02-29

## 2024-02-29 RX ORDER — MAGNESIUM SULFATE IN WATER 40 MG/ML
2000 INJECTION, SOLUTION INTRAVENOUS ONCE
Status: CANCELLED | OUTPATIENT
Start: 2024-02-29 | End: 2024-02-29

## 2024-02-29 RX ADMIN — SODIUM CHLORIDE, PRESERVATIVE FREE 30 ML: 5 INJECTION INTRAVENOUS at 11:26

## 2024-02-29 RX ADMIN — SODIUM CHLORIDE, PRESERVATIVE FREE 10 ML: 5 INJECTION INTRAVENOUS at 09:18

## 2024-02-29 RX ADMIN — MAGNESIUM SULFATE HEPTAHYDRATE 2000 MG: 40 INJECTION, SOLUTION INTRAVENOUS at 09:26

## 2024-02-29 NOTE — PROGRESS NOTES
[facility] Progress Note    Date: 2024    Name: Lucille Arboleda    MRN: [id number]         : 1945    2 Gram Magnesium Infusion    Ms. Arboleda to [facility], ambulatory, at 0905. Pt was assessed and education was provided. Pt has received multiple magnesium infusions in the past and reported she tolerates them well.    Ms. Arboleda's vitals were reviewed and patient was observed for 5 minutes prior to treatment. Took bp medication this am.  Vitals:    24 1126   BP: (!) 173/69   Pulse: 55   Resp: 16   Temp: 96.9 °F (36.1 °C)   SpO2: 97%       24 g PIV placed in L AC x 1 attempt. PIV flushed easily and had brisk blood return.    Magnesium sulfate 2 gram/50ml NS infused over 2 hours as ordered.     VS stable and pt denied complaints of itching, lip/tongue/facial swelling, SOB, CP or other complaints.    Patient Vitals for the past 12 hrs:   Temp Pulse Resp BP SpO2   24 1126 96.9 °F (36.1 °C) 55 16 (!) 173/69 97 %   24 0905 97 °F (36.1 °C) 53 16 (!) 143/69 97 %          Ms. Arboleda tolerated the infusion, and had no complaints.      PIV flushed with NS 10 ml and removed. No bleeding or hematoma noted at site. Gauze and coban applied.    Reviewed discharge instructions with patient. She verbalized understanding.    Patient armband removed and shredded.    [facility] at 1130. She is to follow up with MD Wheat as needed.    ABRIL LANDRY RN  2024  4:02 PM

## 2024-06-06 ENCOUNTER — HOSPITAL ENCOUNTER (OUTPATIENT)
Facility: HOSPITAL | Age: 79
Setting detail: INFUSION SERIES
End: 2024-06-06
Payer: MEDICARE

## 2024-06-06 VITALS
RESPIRATION RATE: 18 BRPM | TEMPERATURE: 97.5 F | DIASTOLIC BLOOD PRESSURE: 74 MMHG | SYSTOLIC BLOOD PRESSURE: 155 MMHG | OXYGEN SATURATION: 97 % | HEART RATE: 51 BPM

## 2024-06-06 DIAGNOSIS — E83.42 HYPOMAGNESEMIA: Primary | ICD-10-CM

## 2024-06-06 PROCEDURE — 6360000002 HC RX W HCPCS: Performed by: INTERNAL MEDICINE

## 2024-06-06 PROCEDURE — 96366 THER/PROPH/DIAG IV INF ADDON: CPT

## 2024-06-06 PROCEDURE — 2580000003 HC RX 258: Performed by: INTERNAL MEDICINE

## 2024-06-06 PROCEDURE — 96365 THER/PROPH/DIAG IV INF INIT: CPT

## 2024-06-06 RX ORDER — SODIUM CHLORIDE 9 MG/ML
5-250 INJECTION, SOLUTION INTRAVENOUS PRN
Status: ACTIVE | OUTPATIENT
Start: 2024-06-06 | End: 2024-06-07

## 2024-06-06 RX ORDER — SODIUM CHLORIDE 9 MG/ML
INJECTION, SOLUTION INTRAVENOUS CONTINUOUS
OUTPATIENT
Start: 2024-06-06

## 2024-06-06 RX ORDER — ONDANSETRON 2 MG/ML
8 INJECTION INTRAMUSCULAR; INTRAVENOUS
OUTPATIENT
Start: 2024-06-06

## 2024-06-06 RX ORDER — MAGNESIUM SULFATE IN WATER 40 MG/ML
2000 INJECTION, SOLUTION INTRAVENOUS ONCE
Status: CANCELLED | OUTPATIENT
Start: 2024-06-06 | End: 2024-06-06

## 2024-06-06 RX ORDER — MAGNESIUM SULFATE IN WATER 40 MG/ML
2000 INJECTION, SOLUTION INTRAVENOUS ONCE
Status: COMPLETED | OUTPATIENT
Start: 2024-06-06 | End: 2024-06-06

## 2024-06-06 RX ORDER — HEPARIN 100 UNIT/ML
500 SYRINGE INTRAVENOUS PRN
OUTPATIENT
Start: 2024-06-06

## 2024-06-06 RX ORDER — SODIUM CHLORIDE 0.9 % (FLUSH) 0.9 %
5-40 SYRINGE (ML) INJECTION PRN
OUTPATIENT
Start: 2024-06-06

## 2024-06-06 RX ORDER — SODIUM CHLORIDE 0.9 % (FLUSH) 0.9 %
5-40 SYRINGE (ML) INJECTION PRN
Status: ACTIVE | OUTPATIENT
Start: 2024-06-06 | End: 2024-06-07

## 2024-06-06 RX ORDER — ALBUTEROL SULFATE 90 UG/1
4 AEROSOL, METERED RESPIRATORY (INHALATION) PRN
OUTPATIENT
Start: 2024-06-06

## 2024-06-06 RX ORDER — ACETAMINOPHEN 325 MG/1
650 TABLET ORAL
OUTPATIENT
Start: 2024-06-06

## 2024-06-06 RX ORDER — DIPHENHYDRAMINE HYDROCHLORIDE 50 MG/ML
50 INJECTION INTRAMUSCULAR; INTRAVENOUS
OUTPATIENT
Start: 2024-06-06

## 2024-06-06 RX ORDER — EPINEPHRINE 1 MG/ML
0.3 INJECTION, SOLUTION, CONCENTRATE INTRAVENOUS PRN
OUTPATIENT
Start: 2024-06-06

## 2024-06-06 RX ORDER — SODIUM CHLORIDE 9 MG/ML
5-250 INJECTION, SOLUTION INTRAVENOUS PRN
OUTPATIENT
Start: 2024-06-06

## 2024-06-06 RX ADMIN — MAGNESIUM SULFATE HEPTAHYDRATE 2000 MG: 40 INJECTION, SOLUTION INTRAVENOUS at 11:30

## 2024-06-06 RX ADMIN — SODIUM CHLORIDE 25 ML/HR: 9 INJECTION, SOLUTION INTRAVENOUS at 11:28

## 2024-06-06 RX ADMIN — SODIUM CHLORIDE, PRESERVATIVE FREE 10 ML: 5 INJECTION INTRAVENOUS at 11:20

## 2024-06-06 NOTE — PROGRESS NOTES
[facility] Progress Note    Date: 2024    Name: Lucille Arboleda    MRN: [id number]         : 1945    2 Gram Magnesium Infusion    Ms. Arboleda to [facility], ambulatory and in stable condition, at 1100. Pt was assessed and education was provided. Pt has received multiple magnesium infusions in the past and reported she tolerates them well.      Vitals:    24 1100   BP: (!) 164/66   Pulse: 53   Resp: 16   Temp: 97.8 °F (36.6 °C)   SpO2: 97%       24 g PIV placed in LEFT DORSAL FOREARM on 2nd attempt. Obtained brisk blood return PIV flushed easily with NS.     250mL 0.9% SODIUM CHLORIDE HUNG TO INFUSE @ [facility] THROUGHOUT TODAY'S TREATMENT.    MAGNESIUM SULFATE 2 GM IN 50 mL NS INFUSED OVER 2 HOURS AS ORDERED.    VS stable and pt denied complaints of itching, lip/tongue/facial swelling, SOB, CP or other complaints.    Patient Vitals for the past 12 hrs:   Temp Pulse Resp BP SpO2   24 1100 97.8 °F (36.6 °C) 53 16 (!) 164/66 97 %          Ms. Arboleda tolerated the Magnesium infusion well, and had no complaints.      PIV flushed with NS 10 ml and removed. No bleeding or hematoma noted at site. Gauze and coban dressing applied.    Reviewed discharge instructions with patient and she verbalized understanding.  Patient's  armband removed and shredded.    Ms. Arboleda was [facility] ambulatory and in stable condition at 1405. She has no further [facility] appointments scheduled and was instructed to follow up with Dr. Wheat for further instructions. She expressed understanding.     Hermelinda Thakkar RN  2024

## 2024-07-22 ENCOUNTER — HOSPITAL ENCOUNTER (OUTPATIENT)
Facility: HOSPITAL | Age: 79
Setting detail: INFUSION SERIES
Discharge: HOME | End: 2024-07-25
Payer: MEDICARE

## 2024-07-22 VITALS
OXYGEN SATURATION: 96 % | RESPIRATION RATE: 16 BRPM | DIASTOLIC BLOOD PRESSURE: 64 MMHG | TEMPERATURE: 98.3 F | HEART RATE: 59 BPM | SYSTOLIC BLOOD PRESSURE: 145 MMHG

## 2024-07-22 DIAGNOSIS — E83.42 HYPOMAGNESEMIA: Primary | ICD-10-CM

## 2024-07-22 PROCEDURE — 96366 THER/PROPH/DIAG IV INF ADDON: CPT

## 2024-07-22 PROCEDURE — 2580000003 HC RX 258: Performed by: INTERNAL MEDICINE

## 2024-07-22 PROCEDURE — 6360000002 HC RX W HCPCS: Performed by: INTERNAL MEDICINE

## 2024-07-22 PROCEDURE — 96365 THER/PROPH/DIAG IV INF INIT: CPT

## 2024-07-22 RX ORDER — SODIUM CHLORIDE 9 MG/ML
5-250 INJECTION, SOLUTION INTRAVENOUS PRN
OUTPATIENT
Start: 2024-07-22

## 2024-07-22 RX ORDER — EPINEPHRINE 1 MG/ML
0.3 INJECTION, SOLUTION, CONCENTRATE INTRAVENOUS PRN
OUTPATIENT
Start: 2024-07-22

## 2024-07-22 RX ORDER — DIPHENHYDRAMINE HYDROCHLORIDE 50 MG/ML
50 INJECTION INTRAMUSCULAR; INTRAVENOUS
OUTPATIENT
Start: 2024-07-22

## 2024-07-22 RX ORDER — MAGNESIUM SULFATE IN WATER 40 MG/ML
2000 INJECTION, SOLUTION INTRAVENOUS ONCE
Status: COMPLETED | OUTPATIENT
Start: 2024-07-22 | End: 2024-07-22

## 2024-07-22 RX ORDER — SODIUM CHLORIDE 9 MG/ML
INJECTION, SOLUTION INTRAVENOUS CONTINUOUS
OUTPATIENT
Start: 2024-07-22

## 2024-07-22 RX ORDER — ALBUTEROL SULFATE 90 UG/1
4 AEROSOL, METERED RESPIRATORY (INHALATION) PRN
OUTPATIENT
Start: 2024-07-22

## 2024-07-22 RX ORDER — MAGNESIUM SULFATE IN WATER 40 MG/ML
2000 INJECTION, SOLUTION INTRAVENOUS ONCE
Status: CANCELLED | OUTPATIENT
Start: 2024-07-22 | End: 2024-07-22

## 2024-07-22 RX ORDER — SODIUM CHLORIDE 0.9 % (FLUSH) 0.9 %
5-40 SYRINGE (ML) INJECTION PRN
Status: ACTIVE | OUTPATIENT
Start: 2024-07-22 | End: 2024-07-23

## 2024-07-22 RX ORDER — SODIUM CHLORIDE 9 MG/ML
5-250 INJECTION, SOLUTION INTRAVENOUS PRN
Status: ACTIVE | OUTPATIENT
Start: 2024-07-22 | End: 2024-07-23

## 2024-07-22 RX ORDER — ACETAMINOPHEN 325 MG/1
650 TABLET ORAL
OUTPATIENT
Start: 2024-07-22

## 2024-07-22 RX ORDER — SODIUM CHLORIDE 0.9 % (FLUSH) 0.9 %
5-40 SYRINGE (ML) INJECTION PRN
OUTPATIENT
Start: 2024-07-22

## 2024-07-22 RX ORDER — ONDANSETRON 2 MG/ML
8 INJECTION INTRAMUSCULAR; INTRAVENOUS
OUTPATIENT
Start: 2024-07-22

## 2024-07-22 RX ORDER — HEPARIN 100 UNIT/ML
500 SYRINGE INTRAVENOUS PRN
OUTPATIENT
Start: 2024-07-22

## 2024-07-22 RX ADMIN — SODIUM CHLORIDE 25 ML/HR: 9 INJECTION, SOLUTION INTRAVENOUS at 13:20

## 2024-07-22 RX ADMIN — MAGNESIUM SULFATE HEPTAHYDRATE 2000 MG: 40 INJECTION, SOLUTION INTRAVENOUS at 13:41

## 2024-07-22 RX ADMIN — SODIUM CHLORIDE, PRESERVATIVE FREE 10 ML: 5 INJECTION INTRAVENOUS at 13:20

## 2024-07-22 NOTE — PROGRESS NOTES
[facility] Progress Note    Date: 2024    Name: Lucille Arboleda    MRN: [id number]         : 1945    2 Gram Magnesium Infusion    Ms. Arboleda to [facility], ambulatory and in stable condition, at 1305. Pt was assessed and education was provided. Pt has received multiple magnesium infusions in the past and reported she tolerates them well.    Vitals:    24 1305   BP: (!) 145/64   Pulse: 59   Resp: 16   Temp: 98.3 °F (36.8 °C)   SpO2: 96%       24 g PIV placed in LEFT AC VEIN on 1st attempt. Obtained brisk blood return and PIV flushed easily with NS.     250mL 0.9% SODIUM CHLORIDE HUNG TO INFUSE @ [facility]OUGH[facility] TREATMENT.    MAGNESIUM SULFATE 2 GM IN 50 mL NS INFUSED OVER 2 HOURS AS ORDERED.    VS stable and pt denied complaints of itching, lip/tongue/facial swelling, SOB, CP or other complaints.    Ms. Arboleda tolerated the Magnesium infusion well, and had no complaints.      PIV flushed with NS 10 ml and removed. No bleeding or hematoma noted at site. Gauze and coban dressing applied.    Reviewed discharge instructions with patient and she verbalized understanding.  Patient's armband removed and shredded.    Ms. Arboleda was [facility]ulatory and in stable condition at 1605. She has no further [facility] appointments scheduled and was instructed to follow up with Dr. Wheat for further instructions. She expressed understanding.     Hermelinda Thakkar RN  2024

## 2025-06-05 ENCOUNTER — TRANSCRIBE ORDERS (OUTPATIENT)
Facility: HOSPITAL | Age: 80
End: 2025-06-05

## 2025-06-05 DIAGNOSIS — M81.0 OSTEOPOROSIS, UNSPECIFIED OSTEOPOROSIS TYPE, UNSPECIFIED PATHOLOGICAL FRACTURE PRESENCE: Primary | ICD-10-CM

## 2025-07-30 ENCOUNTER — HOSPITAL ENCOUNTER (OUTPATIENT)
Facility: HOSPITAL | Age: 80
Discharge: HOME | End: 2025-08-02
Attending: INTERNAL MEDICINE
Payer: MEDICARE

## 2025-07-30 ENCOUNTER — HOSPITAL ENCOUNTER (OUTPATIENT)
Dept: WOMENS IMAGING | Facility: HOSPITAL | Age: 80
Discharge: HOME | End: 2025-08-02
Attending: INTERNAL MEDICINE
Payer: MEDICARE

## 2025-07-30 DIAGNOSIS — M81.0 OSTEOPOROSIS, UNSPECIFIED OSTEOPOROSIS TYPE, UNSPECIFIED PATHOLOGICAL FRACTURE PRESENCE: ICD-10-CM

## 2025-07-30 DIAGNOSIS — Z12.31 OTHER SCREENING MAMMOGRAM: ICD-10-CM

## 2025-07-30 PROCEDURE — 77063 BREAST TOMOSYNTHESIS BI: CPT

## 2025-07-30 PROCEDURE — 77080 DXA BONE DENSITY AXIAL: CPT

## (undated) DEVICE — DEPAUL TOTAL JOINT CDS: Brand: MEDLINE INDUSTRIES, INC.

## (undated) DEVICE — COVER LT HNDL BLU PLAS

## (undated) DEVICE — Device

## (undated) DEVICE — NEEDLE HYPO 22GA L1.5IN BLK S STL HUB POLYPR SHLD REG BVL

## (undated) DEVICE — TABLE COVER: Brand: CONVERTORS

## (undated) DEVICE — 3M™ TEGADERM™ TRANSPARENT FILM DRESSING FRAME STYLE, 1626W, 4 IN X 4-3/4 IN (10 CM X 12 CM), 50/CT 4CT/CASE: Brand: 3M™ TEGADERM™

## (undated) DEVICE — REM POLYHESIVE ADULT PATIENT RETURN ELECTRODE: Brand: VALLEYLAB

## (undated) DEVICE — 3M™ IOBAN™ 2 ANTIMICROBIAL INCISE DRAPE 6651EZ: Brand: IOBAN™ 2

## (undated) DEVICE — CONTROL SYRINGE LUER-LOCK TIP: Brand: MONOJECT

## (undated) DEVICE — WIRE K 2.0X190MM

## (undated) DEVICE — SUTURE VCRL SZ 0 L18IN ABSRB UD L36MM CT-1 1/2 CIR J840D

## (undated) DEVICE — INTENDED FOR TISSUE SEPARATION, AND OTHER PROCEDURES THAT REQUIRE A SHARP SURGICAL BLADE TO PUNCTURE OR CUT.: Brand: BARD-PARKER SAFETY BLADES SIZE 15, STERILE

## (undated) DEVICE — I DISPOSABLE MIXING BOWL AND SPATULA: Brand: HOWMEDICA, MIX-KIT

## (undated) DEVICE — GAUZE SPONGES,12 PLY: Brand: CURITY

## (undated) DEVICE — (D)GLOVE SURG 8 PWD LTX -- DISC BY MFR USE ITEM 110052

## (undated) DEVICE — SHEET, DRAPE, SPLIT, STERILE: Brand: MEDLINE

## (undated) DEVICE — SOL IRRIGATION INJ NACL 0.9% 500ML BTL

## (undated) DEVICE — SUTURE VCRL SZ 2-0 L18IN ABSRB UD CT-1 L36MM 1/2 CIR J839D

## (undated) DEVICE — (D)SYR 30ML LR LCK 1ML GRAD -- DISC BY MFR  USE ITEM 304151

## (undated) DEVICE — SUTURE FIBERWIRE SZ 2 W/ TAPERED NEEDLE BLUE L38IN NONABSORB BLU L26.5MM 1/2 CIRCLE AR7200

## (undated) DEVICE — DRAPE,U/ SHT,SPLIT,PLAS,STERIL: Brand: MEDLINE

## (undated) DEVICE — SPONGE GZ W4XL4IN COT 12 PLY TYP VII WVN C FLD DSGN

## (undated) DEVICE — 3M™ STERI-DRAPE™ INCISE DRAPE, XL 1051: Brand: STERI-DRAPE™

## (undated) DEVICE — SUTURE ABSORBABLE BRAIDED 2-0 CT-1 27 IN UD VICRYL J259H

## (undated) DEVICE — STERILE LATEX POWDER-FREE SURGICAL GLOVES: Brand: PROTEXIS

## (undated) DEVICE — KENDALL SCD EXPRESS SLEEVES, KNEE LENGTH, MEDIUM: Brand: KENDALL SCD

## (undated) DEVICE — KIT CHAIR TRIMANO FOAM W/ SUPP ARM DRP ERGONOMICALLY DESIGNED

## (undated) DEVICE — PREP SKN CHLRAPRP 26ML TNT -- CONVERT TO ITEM 373320

## (undated) DEVICE — LIGHT HANDLE: Brand: DEVON

## (undated) DEVICE — SUTURE MCRYL SZ 4-0 L18IN ABSRB UD L19MM PS-2 3/8 CIR PRIM Y496G

## (undated) DEVICE — CEMEX SYSTEM CONTAINER IS A CONTAINER FOR TECRES' CEMEX SYSTEM THAT ALLOWS THE MIXING OF CEMEX SYSTEM BONE CEMENT INTO TECRES' CEMEX SYSTEM MIXER.: Brand: CEMEX SYSTEM CONTAINER

## (undated) DEVICE — BLADE SAW W0.98XL3.54IN THK0.05IN CUT THK0.05IN SAG

## (undated) DEVICE — SYSTEM SKIN CLSR 22CM DERMBND PRINEO

## (undated) DEVICE — SOLUTION IV STRL H2O 500 ML AQUALITE POUR BTL

## (undated) DEVICE — ELASTIC SHOULDER IMMOBILIZER: Brand: DEROYAL

## (undated) DEVICE — HEX-LOCKING BLADE ELECTRODE: Brand: EDGE

## (undated) DEVICE — STERILE LATEX POWDER-FREE SURGICAL GLOVESWITH NITRILE COATING: Brand: PROTEXIS

## (undated) DEVICE — BIT DRL KIT SHLDR 2MM/3.2MM -- DISP EQUINOXE

## (undated) DEVICE — INTENDED FOR TISSUE SEPARATION, AND OTHER PROCEDURES THAT REQUIRE A SHARP SURGICAL BLADE TO PUNCTURE OR CUT.: Brand: BARD-PARKER SAFETY BLADES SIZE 10, STERILE